# Patient Record
Sex: MALE | Race: WHITE | NOT HISPANIC OR LATINO | Employment: FULL TIME | URBAN - METROPOLITAN AREA
[De-identification: names, ages, dates, MRNs, and addresses within clinical notes are randomized per-mention and may not be internally consistent; named-entity substitution may affect disease eponyms.]

---

## 2017-05-31 PROBLEM — E03.9 HYPOTHYROIDISM: Status: ACTIVE | Noted: 2017-05-31

## 2017-05-31 PROBLEM — T78.40XA ALLERGIC: Status: ACTIVE | Noted: 2017-05-31

## 2017-05-31 PROBLEM — L98.9 SKIN LESION: Status: ACTIVE | Noted: 2017-05-31

## 2017-05-31 PROBLEM — K21.9 GERD (GASTROESOPHAGEAL REFLUX DISEASE): Status: ACTIVE | Noted: 2017-05-31

## 2017-05-31 PROBLEM — G47.9 SLEEP DISTURBANCE: Status: ACTIVE | Noted: 2017-05-31

## 2017-07-25 ENCOUNTER — LAB (OUTPATIENT)
Dept: LAB | Facility: HOSPITAL | Age: 52
End: 2017-07-25

## 2017-07-25 ENCOUNTER — OFFICE VISIT (OUTPATIENT)
Dept: ONCOLOGY | Facility: CLINIC | Age: 52
End: 2017-07-25

## 2017-07-25 VITALS
TEMPERATURE: 97.7 F | HEART RATE: 75 BPM | WEIGHT: 217 LBS | BODY MASS INDEX: 32.14 KG/M2 | HEIGHT: 69 IN | RESPIRATION RATE: 16 BRPM | DIASTOLIC BLOOD PRESSURE: 71 MMHG | SYSTOLIC BLOOD PRESSURE: 140 MMHG

## 2017-07-25 DIAGNOSIS — Z85.71 HISTORY OF HODGKIN'S DISEASE: Primary | ICD-10-CM

## 2017-07-25 DIAGNOSIS — Z85.71 HISTORY OF HODGKIN'S DISEASE: ICD-10-CM

## 2017-07-25 LAB
ALBUMIN SERPL-MCNC: 4.7 G/DL (ref 3.2–4.8)
ALBUMIN/GLOB SERPL: 1.6 G/DL (ref 1.5–2.5)
ALP SERPL-CCNC: 60 U/L (ref 25–100)
ALT SERPL W P-5'-P-CCNC: 30 U/L (ref 7–40)
ANION GAP SERPL CALCULATED.3IONS-SCNC: 6 MMOL/L (ref 3–11)
AST SERPL-CCNC: 18 U/L (ref 0–33)
BASOPHILS # BLD AUTO: 0.05 10*3/MM3 (ref 0–0.2)
BASOPHILS NFR BLD AUTO: 0.8 % (ref 0–1)
BILIRUB SERPL-MCNC: 0.3 MG/DL (ref 0.3–1.2)
BUN BLD-MCNC: 13 MG/DL (ref 9–23)
BUN/CREAT SERPL: 14.4 (ref 7–25)
CALCIUM SPEC-SCNC: 9.2 MG/DL (ref 8.7–10.4)
CHLORIDE SERPL-SCNC: 104 MMOL/L (ref 99–109)
CO2 SERPL-SCNC: 27 MMOL/L (ref 20–31)
CREAT BLD-MCNC: 0.9 MG/DL (ref 0.6–1.3)
DEPRECATED RDW RBC AUTO: 45.9 FL (ref 37–54)
EOSINOPHIL # BLD AUTO: 0.15 10*3/MM3 (ref 0–0.3)
EOSINOPHIL NFR BLD AUTO: 2.4 % (ref 0–3)
ERYTHROCYTE [DISTWIDTH] IN BLOOD BY AUTOMATED COUNT: 13.5 % (ref 11.3–14.5)
GFR SERPL CREATININE-BSD FRML MDRD: 89 ML/MIN/1.73
GLOBULIN UR ELPH-MCNC: 2.9 GM/DL
GLUCOSE BLD-MCNC: 99 MG/DL (ref 70–100)
HCT VFR BLD AUTO: 42.7 % (ref 38.9–50.9)
HGB BLD-MCNC: 14 G/DL (ref 13.1–17.5)
IMM GRANULOCYTES # BLD: 0.03 10*3/MM3 (ref 0–0.03)
IMM GRANULOCYTES NFR BLD: 0.5 % (ref 0–0.6)
LYMPHOCYTES # BLD AUTO: 1.03 10*3/MM3 (ref 0.6–4.8)
LYMPHOCYTES NFR BLD AUTO: 16.6 % (ref 24–44)
MCH RBC QN AUTO: 30.4 PG (ref 27–31)
MCHC RBC AUTO-ENTMCNC: 32.8 G/DL (ref 32–36)
MCV RBC AUTO: 92.8 FL (ref 80–99)
MONOCYTES # BLD AUTO: 0.52 10*3/MM3 (ref 0–1)
MONOCYTES NFR BLD AUTO: 8.4 % (ref 0–12)
NEUTROPHILS # BLD AUTO: 4.44 10*3/MM3 (ref 1.5–8.3)
NEUTROPHILS NFR BLD AUTO: 71.3 % (ref 41–71)
PLATELET # BLD AUTO: 339 10*3/MM3 (ref 150–450)
PMV BLD AUTO: 9 FL (ref 6–12)
POTASSIUM BLD-SCNC: 4.4 MMOL/L (ref 3.5–5.5)
PROT SERPL-MCNC: 7.6 G/DL (ref 5.7–8.2)
RBC # BLD AUTO: 4.6 10*6/MM3 (ref 4.2–5.76)
SODIUM BLD-SCNC: 137 MMOL/L (ref 132–146)
T4 FREE SERPL-MCNC: 1.29 NG/DL (ref 0.89–1.76)
TSH SERPL DL<=0.05 MIU/L-ACNC: 1.57 MIU/ML (ref 0.35–5.35)
WBC NRBC COR # BLD: 6.22 10*3/MM3 (ref 3.5–10.8)

## 2017-07-25 PROCEDURE — 84443 ASSAY THYROID STIM HORMONE: CPT | Performed by: INTERNAL MEDICINE

## 2017-07-25 PROCEDURE — 84439 ASSAY OF FREE THYROXINE: CPT | Performed by: INTERNAL MEDICINE

## 2017-07-25 PROCEDURE — 99213 OFFICE O/P EST LOW 20 MIN: CPT | Performed by: INTERNAL MEDICINE

## 2017-07-25 PROCEDURE — 80053 COMPREHEN METABOLIC PANEL: CPT | Performed by: INTERNAL MEDICINE

## 2017-07-25 PROCEDURE — 85025 COMPLETE CBC W/AUTO DIFF WBC: CPT | Performed by: INTERNAL MEDICINE

## 2017-07-25 PROCEDURE — 36415 COLL VENOUS BLD VENIPUNCTURE: CPT

## 2017-07-25 RX ORDER — LEVOTHYROXINE SODIUM 112 UG/1
TABLET ORAL
Refills: 0 | COMMUNITY
Start: 2017-07-02 | End: 2018-08-07 | Stop reason: DRUGHIGH

## 2017-07-25 RX ORDER — RANITIDINE 150 MG/1
TABLET ORAL
Refills: 0 | COMMUNITY
Start: 2017-06-05 | End: 2020-09-04

## 2017-07-25 NOTE — PROGRESS NOTES
"Chief Complaint   ***    PROBLEM LIST   Patient Active Problem List   Diagnosis   • History of Hodgkin's disease   • Hypothyroidism   • GERD (gastroesophageal reflux disease)   • Sleep disturbance   • Allergic   • Skin lesion       HISTORY OF PRESENT ILLNESS:   ***    Past Medical History, Past Surgical History, Social History, Family History have been reviewed and are without significant changes except as mentioned.      Medications:      Current Outpatient Prescriptions:   •  ALPRAZolam (XANAX) 1 MG tablet, Take 1 mg by mouth daily as needed for anxiety., Disp: , Rfl:   •  cetirizine (ZyrTEC) 10 MG tablet, Take 10 mg by mouth daily., Disp: , Rfl:   •  esomeprazole (NexIUM) 20 MG capsule, Take 20 mg by mouth daily., Disp: , Rfl:   •  levothyroxine (SYNTHROID, LEVOTHROID) 100 MCG tablet, Take 100 mcg by mouth daily., Disp: , Rfl:   •  levothyroxine (SYNTHROID, LEVOTHROID) 112 MCG tablet, take 1 tablet by mouth once daily, Disp: , Rfl: 0  •  omeprazole (PriLOSEC) 20 MG capsule, Take 20 mg by mouth daily., Disp: , Rfl:   •  raNITIdine (ZANTAC) 150 MG tablet, take 1 tablet by mouth twice a day, Disp: , Rfl: 0  •  vitamin B-12 (CYANOCOBALAMIN) 1000 MCG tablet, Take 1,000 mcg by mouth every 30 (thirty) days., Disp: , Rfl:   •  vitamin D (ERGOCALCIFEROL) 91765 UNITS capsule capsule, Take 50,000 Units by mouth 1 (one) time per week., Disp: , Rfl:   •  zolpidem (AMBIEN) 10 MG tablet, Take 10 mg by mouth at night as needed for sleep., Disp: , Rfl:     ALLERGIES:    Allergies   Allergen Reactions   • Codeine Sulfate        ROS:  Review of Systems  ***      Objective:    /71 Comment: LUE  Pulse 75  Temp 97.7 °F (36.5 °C) (Temporal Artery )   Resp 16  Ht 69\" (175.3 cm)  Wt 217 lb (98.4 kg)  BMI 32.05 kg/m2    Physical Exam ***          RECENT LABS:  Hematology WBC   Date Value Ref Range Status   07/19/2016 6.82 3.50 - 10.80 10*3/mm3 Final     Hemoglobin   Date Value Ref Range Status   07/19/2016 14.2 13.1 - 17.5 " g/dL Final     Hematocrit   Date Value Ref Range Status   07/19/2016 42.4 38.9 - 50.9 % Final     MCV   Date Value Ref Range Status   07/19/2016 89.8 80.0 - 99.0 fL Final     RDW   Date Value Ref Range Status   07/19/2016 13.3 11.3 - 14.5 % Final     MPV   Date Value Ref Range Status   07/19/2016 9.1 6.0 - 12.0 fL Final     Platelets   Date Value Ref Range Status   07/19/2016 322 150 - 450 10*3/mm3 Final     Immature Grans %   Date Value Ref Range Status   07/19/2016 0.3 0.0 - 0.6 % Final     Neutrophils, Absolute   Date Value Ref Range Status   07/19/2016 5.06 1.50 - 8.30 10*3/mm3 Final     Lymphocytes, Absolute   Date Value Ref Range Status   07/19/2016 0.91 0.60 - 4.80 10*3/mm3 Final     Monocytes, Absolute   Date Value Ref Range Status   07/19/2016 0.66 0.00 - 1.00 10*3/mm3 Final     Eosinophils, Absolute   Date Value Ref Range Status   07/19/2016 0.12 0.10 - 0.30 10*3/mm3 Final     Basophils, Absolute   Date Value Ref Range Status   07/19/2016 0.05 0.00 - 0.20 10*3/mm3 Final     Immature Grans, Absolute   Date Value Ref Range Status   07/19/2016 0.02 0.00 - 0.03 10*3/mm3 Final       Glucose   Date Value Ref Range Status   07/19/2016 90 70 - 100 mg/dL Final     Sodium   Date Value Ref Range Status   07/19/2016 136 132 - 146 mmol/L Final     Potassium   Date Value Ref Range Status   07/19/2016 4.2 3.5 - 5.5 mmol/L Final     CO2   Date Value Ref Range Status   07/19/2016 27.0 20.0 - 31.0 mmol/L Final     Chloride   Date Value Ref Range Status   07/19/2016 100 99 - 109 mmol/L Final     Anion Gap   Date Value Ref Range Status   07/19/2016 9.0 3.0 - 11.0 mmol/L Final     Creatinine   Date Value Ref Range Status   07/19/2016 0.90 0.60 - 1.30 mg/dL Final     BUN   Date Value Ref Range Status   07/19/2016 11 9 - 23 mg/dL Final     BUN/Creatinine Ratio   Date Value Ref Range Status   07/19/2016 12.2 7.0 - 25.0 Final     Calcium   Date Value Ref Range Status   07/19/2016 9.3 8.7 - 10.4 mg/dL Final     eGFR Non African  Amer   Date Value Ref Range Status   07/19/2016 90 >60 mL/min/1.73 Final     Alkaline Phosphatase   Date Value Ref Range Status   07/19/2016 51 25 - 100 U/L Final     Total Protein   Date Value Ref Range Status   07/19/2016 8.0 5.7 - 8.2 g/dL Final     ALT (SGPT)   Date Value Ref Range Status   07/19/2016 27 7 - 40 U/L Final     AST (SGOT)   Date Value Ref Range Status   07/19/2016 20 0 - 33 U/L Final     Total Bilirubin   Date Value Ref Range Status   07/19/2016 0.4 0.3 - 1.2 mg/dL Final     Albumin   Date Value Ref Range Status   07/19/2016 4.80 3.20 - 4.80 g/dL Final     Globulin   Date Value Ref Range Status   07/19/2016 3.2 gm/dL Final     A/G Ratio   Date Value Ref Range Status   07/19/2016 1.5 g/dL Final          Perf Status: ***    Assessment/Plan    Diagnoses and all orders for this visit:    History of Hodgkin's disease      ***      Jerod Tate MD , 7/25/2017    CC:

## 2017-07-25 NOTE — PROGRESS NOTES
Chief Complaint   Follow-up Hodgkin's lymphoma and chronic hypothyroidism    PROBLEM LIST   Patient Active Problem List   Diagnosis   • History of Hodgkin's disease   • Hypothyroidism   • GERD (gastroesophageal reflux disease)   • Sleep disturbance   • Allergic   • Skin lesion       HISTORY OF PRESENT ILLNESS:   This very very nice 52-year-old gentleman who I met back in 1981 due to his Hodgkin's lymphoma is here for yearly follow-up.  He's doing well in general.  He is a little discussed with his golf game but otherwise life is a blessing.  He works regularly of course.  His wife is  doing well.  His family is doing well    Past Medical History, Past Surgical History, Social History, Family History have been reviewed and are without significant changes except as mentioned.      Medications:      Current Outpatient Prescriptions:   •  ALPRAZolam (XANAX) 1 MG tablet, Take 1 mg by mouth daily as needed for anxiety., Disp: , Rfl:   •  cetirizine (ZyrTEC) 10 MG tablet, Take 10 mg by mouth daily., Disp: , Rfl:   •  esomeprazole (NexIUM) 20 MG capsule, Take 20 mg by mouth daily., Disp: , Rfl:   •  levothyroxine (SYNTHROID, LEVOTHROID) 100 MCG tablet, Take 100 mcg by mouth daily., Disp: , Rfl:   •  levothyroxine (SYNTHROID, LEVOTHROID) 112 MCG tablet, take 1 tablet by mouth once daily, Disp: , Rfl: 0  •  omeprazole (PriLOSEC) 20 MG capsule, Take 20 mg by mouth daily., Disp: , Rfl:   •  raNITIdine (ZANTAC) 150 MG tablet, take 1 tablet by mouth twice a day, Disp: , Rfl: 0  •  vitamin B-12 (CYANOCOBALAMIN) 1000 MCG tablet, Take 1,000 mcg by mouth every 30 (thirty) days., Disp: , Rfl:   •  vitamin D (ERGOCALCIFEROL) 34431 UNITS capsule capsule, Take 50,000 Units by mouth 1 (one) time per week., Disp: , Rfl:   •  zolpidem (AMBIEN) 10 MG tablet, Take 10 mg by mouth at night as needed for sleep., Disp: , Rfl:     ALLERGIES:    Allergies   Allergen Reactions   • Codeine Sulfate        ROS:  Review of Systems   Constitutional:  "Negative for activity change and appetite change.   HENT: Negative for mouth sores, sinus pressure and voice change.    Eyes: Negative for visual disturbance.   Respiratory: Negative for shortness of breath.    Cardiovascular: Negative for chest pain.   Gastrointestinal: Negative for abdominal pain and vomiting.   Genitourinary: Negative for dysuria.   Musculoskeletal: Negative for arthralgias and myalgias.   Skin: Negative for color change.   Neurological: Negative for dizziness, syncope and headaches.   Hematological: Negative for adenopathy.   Psychiatric/Behavioral: Negative for confusion, sleep disturbance and suicidal ideas. The patient is not nervous/anxious.          Objective:    /71 Comment: LUE  Pulse 75  Temp 97.7 °F (36.5 °C) (Temporal Artery )   Resp 16  Ht 69\" (175.3 cm)  Wt 217 lb (98.4 kg)  BMI 32.05 kg/m2    Physical Exam   Constitutional: He is oriented to person, place, and time. He appears well-developed and well-nourished. No distress.   HENT:   Head: Normocephalic.   Mouth/Throat: Oropharynx is clear and moist.   Eyes: Conjunctivae and EOM are normal. No scleral icterus.   Neck: Normal range of motion. Neck supple. No thyromegaly present.   Cardiovascular: Normal rate, regular rhythm and normal heart sounds.    No murmur heard.  Pulmonary/Chest: Effort normal and breath sounds normal. He has no wheezes. He has no rales.   Abdominal: Soft. Bowel sounds are normal. He exhibits no distension and no mass. There is no tenderness.   Genitourinary:   Genitourinary Comments: Testis are descended bilaterally.  No worrisome mass palpable.   Musculoskeletal: He exhibits no edema or tenderness.   Lymphadenopathy:     He has no cervical adenopathy.   Neurological: He is alert and oriented to person, place, and time. He displays normal reflexes. No cranial nerve deficit.   Skin: Skin is warm and dry. No rash noted.   He has what may be a very early tiny basal cell carcinoma on the top of his " left ear   Psychiatric: He has a normal mood and affect. Judgment normal.   Vitals reviewed.             RECENT LABS:  Hematology WBC   Date Value Ref Range Status   07/19/2016 6.82 3.50 - 10.80 10*3/mm3 Final     Hemoglobin   Date Value Ref Range Status   07/19/2016 14.2 13.1 - 17.5 g/dL Final     Hematocrit   Date Value Ref Range Status   07/19/2016 42.4 38.9 - 50.9 % Final     MCV   Date Value Ref Range Status   07/19/2016 89.8 80.0 - 99.0 fL Final     RDW   Date Value Ref Range Status   07/19/2016 13.3 11.3 - 14.5 % Final     MPV   Date Value Ref Range Status   07/19/2016 9.1 6.0 - 12.0 fL Final     Platelets   Date Value Ref Range Status   07/19/2016 322 150 - 450 10*3/mm3 Final     Immature Grans %   Date Value Ref Range Status   07/19/2016 0.3 0.0 - 0.6 % Final     Neutrophils, Absolute   Date Value Ref Range Status   07/19/2016 5.06 1.50 - 8.30 10*3/mm3 Final     Lymphocytes, Absolute   Date Value Ref Range Status   07/19/2016 0.91 0.60 - 4.80 10*3/mm3 Final     Monocytes, Absolute   Date Value Ref Range Status   07/19/2016 0.66 0.00 - 1.00 10*3/mm3 Final     Eosinophils, Absolute   Date Value Ref Range Status   07/19/2016 0.12 0.10 - 0.30 10*3/mm3 Final     Basophils, Absolute   Date Value Ref Range Status   07/19/2016 0.05 0.00 - 0.20 10*3/mm3 Final     Immature Grans, Absolute   Date Value Ref Range Status   07/19/2016 0.02 0.00 - 0.03 10*3/mm3 Final       Glucose   Date Value Ref Range Status   07/19/2016 90 70 - 100 mg/dL Final     Sodium   Date Value Ref Range Status   07/19/2016 136 132 - 146 mmol/L Final     Potassium   Date Value Ref Range Status   07/19/2016 4.2 3.5 - 5.5 mmol/L Final     CO2   Date Value Ref Range Status   07/19/2016 27.0 20.0 - 31.0 mmol/L Final     Chloride   Date Value Ref Range Status   07/19/2016 100 99 - 109 mmol/L Final     Anion Gap   Date Value Ref Range Status   07/19/2016 9.0 3.0 - 11.0 mmol/L Final     Creatinine   Date Value Ref Range Status   07/19/2016 0.90 0.60 -  1.30 mg/dL Final     BUN   Date Value Ref Range Status   07/19/2016 11 9 - 23 mg/dL Final     BUN/Creatinine Ratio   Date Value Ref Range Status   07/19/2016 12.2 7.0 - 25.0 Final     Calcium   Date Value Ref Range Status   07/19/2016 9.3 8.7 - 10.4 mg/dL Final     eGFR Non  Amer   Date Value Ref Range Status   07/19/2016 90 >60 mL/min/1.73 Final     Alkaline Phosphatase   Date Value Ref Range Status   07/19/2016 51 25 - 100 U/L Final     Total Protein   Date Value Ref Range Status   07/19/2016 8.0 5.7 - 8.2 g/dL Final     ALT (SGPT)   Date Value Ref Range Status   07/19/2016 27 7 - 40 U/L Final     AST (SGOT)   Date Value Ref Range Status   07/19/2016 20 0 - 33 U/L Final     Total Bilirubin   Date Value Ref Range Status   07/19/2016 0.4 0.3 - 1.2 mg/dL Final     Albumin   Date Value Ref Range Status   07/19/2016 4.80 3.20 - 4.80 g/dL Final     Globulin   Date Value Ref Range Status   07/19/2016 3.2 gm/dL Final     A/G Ratio   Date Value Ref Range Status   07/19/2016 1.5 g/dL Final          Perf Status: 0    Assessment/Plan    Diagnoses and all orders for this visit:    History of Hodgkin's disease  -     CBC & Differential  -     CBC & Differential; Future  -     Comprehensive Metabolic Panel  -     Comprehensive Metabolic Panel; Future  -     TSH  -     TSH; Future  -     T4, Free  -     T4, Free; Future      Patient has no evidence of any recurrence of Hodgkin's lymphoma.  As far as his hypothyroidism he appears euthyroid on his current dose of Synthroid which is 112 µg daily.    I will follow-up today's labs and call him with the results.    He will see his dermatologist in the near future.    Jerod Tate MD , 7/25/2017    CC:

## 2017-07-25 NOTE — PROGRESS NOTES
Chief Complaint   Follow-up of his lymphoma.  Chronic hypothyroidism.    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  He feels well.  His energy level is good.  He is a bit annoyed by his weight but he just can't seem to lose weight when he tries.  He is very physically active.  He's had a good summer.  Things are going reasonably well at his place of employment.  His wife is doing well.  His parents are now 80 and 82 and they're doing great     He's had lousy luck with his golf game this summer.  He's lost just about every match he has entered    Past Medical History, Past Surgical History, Social History, Family History have been reviewed and are without significant changes except as mentioned.      Medications:      Current Outpatient Prescriptions:   •  ALPRAZolam (XANAX) 1 MG tablet, Take 1 mg by mouth daily as needed for anxiety., Disp: , Rfl:   •  cetirizine (ZyrTEC) 10 MG tablet, Take 10 mg by mouth daily., Disp: , Rfl:   •  esomeprazole (NexIUM) 20 MG capsule, Take 20 mg by mouth daily., Disp: , Rfl:   •  levothyroxine (SYNTHROID, LEVOTHROID) 100 MCG tablet, Take 100 mcg by mouth daily., Disp: , Rfl:   •  levothyroxine (SYNTHROID, LEVOTHROID) 112 MCG tablet, take 1 tablet by mouth once daily, Disp: , Rfl: 0  •  omeprazole (PriLOSEC) 20 MG capsule, Take 20 mg by mouth daily., Disp: , Rfl:   •  raNITIdine (ZANTAC) 150 MG tablet, take 1 tablet by mouth twice a day, Disp: , Rfl: 0  •  vitamin B-12 (CYANOCOBALAMIN) 1000 MCG tablet, Take 1,000 mcg by mouth every 30 (thirty) days., Disp: , Rfl:   •  vitamin D (ERGOCALCIFEROL) 11918 UNITS capsule capsule, Take 50,000 Units by mouth 1 (one) time per  "week., Disp: , Rfl:   •  zolpidem (AMBIEN) 10 MG tablet, Take 10 mg by mouth at night as needed for sleep., Disp: , Rfl:     ALLERGIES:    Allergies   Allergen Reactions   • Codeine Sulfate        ROS:  Review of Systems   Constitutional: Negative for activity change and appetite change.   HENT: Negative for mouth sores, sinus pressure and voice change.    Eyes: Negative for visual disturbance.   Respiratory: Negative for shortness of breath.    Cardiovascular: Negative for chest pain.   Gastrointestinal: Negative for abdominal pain and vomiting.        He underwent recent colonoscopy which showed a tubulovillous adenoma so he'll need repeat endoscopy in about a year   Genitourinary: Negative for dysuria.   Musculoskeletal: Negative for arthralgias and myalgias.   Skin: Negative for color change.   Neurological: Negative for dizziness, syncope and headaches.   Hematological: Negative for adenopathy.   Psychiatric/Behavioral: Negative for confusion, sleep disturbance and suicidal ideas. The patient is not nervous/anxious.            Objective:    /71 Comment: LUE  Pulse 75  Temp 97.7 °F (36.5 °C) (Temporal Artery )   Resp 16  Ht 69\" (175.3 cm)  Wt 217 lb (98.4 kg)  BMI 32.05 kg/m2    Physical Exam   Constitutional: He is oriented to person, place, and time. He appears well-developed and well-nourished. No distress.   HENT:   Head: Normocephalic.   Mouth/Throat: Oropharynx is clear and moist.   Eyes: Conjunctivae and EOM are normal. No scleral icterus.   Neck: Normal range of motion. Neck supple. No thyromegaly present.   Cardiovascular: Normal rate, regular rhythm and normal heart sounds.    No murmur heard.  Pulmonary/Chest: Effort normal and breath sounds normal. He has no wheezes. He has no rales.   Abdominal: Soft. Bowel sounds are normal. He exhibits no distension and no mass. There is no tenderness.   Genitourinary:   Genitourinary Comments: Testis are descended bilaterally.  There is no worrisome " mass palpable   Musculoskeletal: He exhibits no edema or tenderness.   Lymphadenopathy:     He has no cervical adenopathy.   Neurological: He is alert and oriented to person, place, and time. He displays normal reflexes. No cranial nerve deficit.   Skin: Skin is warm and dry. No rash noted.   Psychiatric: He has a normal mood and affect. Judgment normal.   Vitals reviewed.             RECENT LABS:  Hematology WBC   Date Value Ref Range Status   07/19/2016 6.82 3.50 - 10.80 10*3/mm3 Final     Hemoglobin   Date Value Ref Range Status   07/19/2016 14.2 13.1 - 17.5 g/dL Final     Hematocrit   Date Value Ref Range Status   07/19/2016 42.4 38.9 - 50.9 % Final     MCV   Date Value Ref Range Status   07/19/2016 89.8 80.0 - 99.0 fL Final     RDW   Date Value Ref Range Status   07/19/2016 13.3 11.3 - 14.5 % Final     MPV   Date Value Ref Range Status   07/19/2016 9.1 6.0 - 12.0 fL Final     Platelets   Date Value Ref Range Status   07/19/2016 322 150 - 450 10*3/mm3 Final     Immature Grans %   Date Value Ref Range Status   07/19/2016 0.3 0.0 - 0.6 % Final     Neutrophils, Absolute   Date Value Ref Range Status   07/19/2016 5.06 1.50 - 8.30 10*3/mm3 Final     Lymphocytes, Absolute   Date Value Ref Range Status   07/19/2016 0.91 0.60 - 4.80 10*3/mm3 Final     Monocytes, Absolute   Date Value Ref Range Status   07/19/2016 0.66 0.00 - 1.00 10*3/mm3 Final     Eosinophils, Absolute   Date Value Ref Range Status   07/19/2016 0.12 0.10 - 0.30 10*3/mm3 Final     Basophils, Absolute   Date Value Ref Range Status   07/19/2016 0.05 0.00 - 0.20 10*3/mm3 Final     Immature Grans, Absolute   Date Value Ref Range Status   07/19/2016 0.02 0.00 - 0.03 10*3/mm3 Final       Glucose   Date Value Ref Range Status   07/19/2016 90 70 - 100 mg/dL Final     Sodium   Date Value Ref Range Status   07/19/2016 136 132 - 146 mmol/L Final     Potassium   Date Value Ref Range Status   07/19/2016 4.2 3.5 - 5.5 mmol/L Final     CO2   Date Value Ref Range  Status   07/19/2016 27.0 20.0 - 31.0 mmol/L Final     Chloride   Date Value Ref Range Status   07/19/2016 100 99 - 109 mmol/L Final     Anion Gap   Date Value Ref Range Status   07/19/2016 9.0 3.0 - 11.0 mmol/L Final     Creatinine   Date Value Ref Range Status   07/19/2016 0.90 0.60 - 1.30 mg/dL Final     BUN   Date Value Ref Range Status   07/19/2016 11 9 - 23 mg/dL Final     BUN/Creatinine Ratio   Date Value Ref Range Status   07/19/2016 12.2 7.0 - 25.0 Final     Calcium   Date Value Ref Range Status   07/19/2016 9.3 8.7 - 10.4 mg/dL Final     eGFR Non  Amer   Date Value Ref Range Status   07/19/2016 90 >60 mL/min/1.73 Final     Alkaline Phosphatase   Date Value Ref Range Status   07/19/2016 51 25 - 100 U/L Final     Total Protein   Date Value Ref Range Status   07/19/2016 8.0 5.7 - 8.2 g/dL Final     ALT (SGPT)   Date Value Ref Range Status   07/19/2016 27 7 - 40 U/L Final     AST (SGOT)   Date Value Ref Range Status   07/19/2016 20 0 - 33 U/L Final     Total Bilirubin   Date Value Ref Range Status   07/19/2016 0.4 0.3 - 1.2 mg/dL Final     Albumin   Date Value Ref Range Status   07/19/2016 4.80 3.20 - 4.80 g/dL Final     Globulin   Date Value Ref Range Status   07/19/2016 3.2 gm/dL Final     A/G Ratio   Date Value Ref Range Status   07/19/2016 1.5 g/dL Final          Perf Status: 0    Assessment/Plan    Diagnoses and all orders for this visit:    History of Hodgkin's disease    Patient has no evidence of recurrent Hodgkin's.  He appears to be euthyroid by physical examination and by history.    I'll follow-up today's laboratory data.  I'll see him back in a year        Jerod Tate MD , 7/25/2017    CC:

## 2018-07-30 DIAGNOSIS — E03.9 HYPOTHYROIDISM, UNSPECIFIED TYPE: ICD-10-CM

## 2018-07-30 DIAGNOSIS — Z85.71 HISTORY OF HODGKIN'S DISEASE: Primary | ICD-10-CM

## 2018-08-02 ENCOUNTER — OFFICE VISIT (OUTPATIENT)
Dept: ONCOLOGY | Facility: CLINIC | Age: 53
End: 2018-08-02

## 2018-08-02 VITALS
SYSTOLIC BLOOD PRESSURE: 140 MMHG | TEMPERATURE: 98.1 F | BODY MASS INDEX: 28.29 KG/M2 | DIASTOLIC BLOOD PRESSURE: 81 MMHG | WEIGHT: 191 LBS | HEIGHT: 69 IN | RESPIRATION RATE: 16 BRPM | HEART RATE: 71 BPM

## 2018-08-02 DIAGNOSIS — E03.9 HYPOTHYROIDISM, UNSPECIFIED TYPE: ICD-10-CM

## 2018-08-02 DIAGNOSIS — N50.811 TESTICULAR PAIN, RIGHT: Primary | ICD-10-CM

## 2018-08-02 DIAGNOSIS — Z85.71 HISTORY OF HODGKIN'S DISEASE: ICD-10-CM

## 2018-08-02 DIAGNOSIS — Z85.71 HISTORY OF HODGKIN'S DISEASE: Primary | ICD-10-CM

## 2018-08-02 PROCEDURE — 99213 OFFICE O/P EST LOW 20 MIN: CPT | Performed by: INTERNAL MEDICINE

## 2018-08-02 NOTE — PROGRESS NOTES
Chief Complaint   Follow-up remote history of Hodgkin's lymphoma.  Follow-up chronic hypothyroidism.    PROBLEM LIST   Patient Active Problem List   Diagnosis   • History of Hodgkin's disease   • Hypothyroidism   • GERD (gastroesophageal reflux disease)   • Sleep disturbance   • Allergic   • Skin lesion       HISTORY OF PRESENT ILLNESS:   Yearly follow-up for this very nice 53-year-old who I met close to 30 years ago.  He was treated with chemotherapy and subcutaneous radiotherapy for his bulky mediastinal advanced stage lymphoma-classic Hodgkin's.  He's never had a recurrence.  I have continued to see most recently at yearly intervals.    His health is been good.  He's voluntarily lost about 30 or 35 pounds.  He remains very busy as far as his work week.  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    He is continued to have some discomfort which she's had for probably more than a year involving the right testis without any pain at rest.  It's more matter of tenderness.  He exam is himself frequently and he has not noted any Swelling or any count of mass at all.  His health is otherwise been good.    Past Medical History, Past Surgical History, Social History, Family History have been reviewed and are without significant changes except as mentioned.      Medications:      Current Outpatient Prescriptions:   •  ALPRAZolam (XANAX) 1 MG tablet, Take 1 mg by mouth daily as needed for anxiety., Disp: , Rfl:   •  cetirizine (ZyrTEC) 10 MG tablet, Take 10 mg by mouth daily., Disp: , Rfl:   •  esomeprazole (NexIUM) 20 MG capsule, Take 20 mg by mouth daily., Disp: , Rfl:   •  levothyroxine (SYNTHROID, LEVOTHROID) 100 MCG tablet, Take 100 mcg by mouth daily., Disp: , Rfl:   •  levothyroxine (SYNTHROID, LEVOTHROID) 112 MCG tablet, take 1 tablet by mouth once daily, Disp: , Rfl: 0  •  omeprazole (PriLOSEC) 20 MG capsule, Take 20 mg by mouth daily., Disp: , Rfl:   •  raNITIdine (ZANTAC) 150 MG tablet, take 1 tablet by mouth twice a day,  "Disp: , Rfl: 0  •  vitamin B-12 (CYANOCOBALAMIN) 1000 MCG tablet, Take 1,000 mcg by mouth every 30 (thirty) days., Disp: , Rfl:   •  vitamin D (ERGOCALCIFEROL) 39302 UNITS capsule capsule, Take 50,000 Units by mouth 1 (one) time per week., Disp: , Rfl:   •  zolpidem (AMBIEN) 10 MG tablet, Take 10 mg by mouth at night as needed for sleep., Disp: , Rfl:     ALLERGIES:    Allergies   Allergen Reactions   • Codeine Sulfate        ROS:  Review of Systems   Constitutional: Negative for activity change and appetite change.        Overall vigor has been good.  His voluntary weight loss has been good for him   HENT: Negative for mouth sores, sinus pressure and voice change.    Eyes: Negative for visual disturbance.   Respiratory: Negative for shortness of breath.    Cardiovascular: Negative for chest pain.   Gastrointestinal: Negative for abdominal pain and vomiting.   Endocrine:        Hypothyroidism-chronic-supplemented   Genitourinary: Negative for dysuria.        Right testis tenderness mentioned   Musculoskeletal: Negative for arthralgias and myalgias.   Skin: Negative for color change.   Neurological: Negative for dizziness, syncope and headaches.   Hematological: Negative for adenopathy.   Psychiatric/Behavioral: Negative for confusion, sleep disturbance and suicidal ideas. The patient is not nervous/anxious.            Objective:    /81 Comment: CURRYE  Pulse 71   Temp 98.1 °F (36.7 °C) (Temporal Artery )   Resp 16   Ht 175.3 cm (69\")   Wt 86.6 kg (191 lb)   BMI 28.21 kg/m²     Physical Exam   Constitutional: He is oriented to person, place, and time. He appears well-developed and well-nourished. No distress.   Angel appears youthful healthy and vigorous.  He is in no distress.   HENT:   Head: Normocephalic.   Mouth/Throat: Oropharynx is clear and moist.   He has no head and neck lesions noted.  He has no lymphadenopathy in the head and neck or anywhere else   Eyes: Conjunctivae and EOM are normal. No scleral " icterus.   Neck: Normal range of motion. Neck supple. No thyromegaly present.   Cardiovascular: Normal rate, regular rhythm and normal heart sounds.    No murmur heard.  Pulmonary/Chest: Effort normal and breath sounds normal. He has no wheezes. He has no rales.   Abdominal: Soft. Bowel sounds are normal. He exhibits no distension and no mass. There is no tenderness.   Genitourinary:   Genitourinary Comments: Testis are descended bilaterally.  They appear to be normal in size and consistency and there is no worrisome mass palpable.  He has minimal tenderness when I examine his right testis   Musculoskeletal: He exhibits no edema or tenderness.   Lymphadenopathy:     He has no cervical adenopathy.   Neurological: He is alert and oriented to person, place, and time. He displays normal reflexes. No cranial nerve deficit.   Skin: Skin is warm and dry. No rash noted.   Psychiatric: He has a normal mood and affect. Judgment normal.   Vitals reviewed.             RECENT LABS:  Hematology WBC   Date Value Ref Range Status   08/02/2018 7.00 3.50 - 10.80 10*3/mm3 Final     Hemoglobin   Date Value Ref Range Status   08/02/2018 14.1 13.1 - 17.5 g/dL Final     Hematocrit   Date Value Ref Range Status   08/02/2018 43.7 38.9 - 50.9 % Final     MCV   Date Value Ref Range Status   08/02/2018 90.6 80.0 - 99.0 fL Final     RDW   Date Value Ref Range Status   08/02/2018 14.4 11.3 - 14.5 % Final     MPV   Date Value Ref Range Status   08/02/2018 6.9 6.0 - 12.0 fL Final     Platelets   Date Value Ref Range Status   08/02/2018 384 150 - 450 10*3/mm3 Final     Immature Grans %   Date Value Ref Range Status   07/25/2017 0.5 0.0 - 0.6 % Final     Neutrophils, Absolute   Date Value Ref Range Status   08/02/2018 5.30 1.50 - 8.30 10*3/mm3 Final     Lymphocytes, Absolute   Date Value Ref Range Status   08/02/2018 1.30 0.60 - 4.80 10*3/mm3 Final     Monocytes, Absolute   Date Value Ref Range Status   08/02/2018 0.40 0.00 - 1.00 10*3/mm3 Final      Eosinophils, Absolute   Date Value Ref Range Status   07/25/2017 0.15 0.00 - 0.30 10*3/mm3 Final     Basophils, Absolute   Date Value Ref Range Status   07/25/2017 0.05 0.00 - 0.20 10*3/mm3 Final     Immature Grans, Absolute   Date Value Ref Range Status   07/25/2017 0.03 0.00 - 0.03 10*3/mm3 Final       Glucose   Date Value Ref Range Status   07/25/2017 99 70 - 100 mg/dL Final     Sodium   Date Value Ref Range Status   07/25/2017 137 132 - 146 mmol/L Final     Potassium   Date Value Ref Range Status   07/25/2017 4.4 3.5 - 5.5 mmol/L Final     CO2   Date Value Ref Range Status   07/25/2017 27.0 20.0 - 31.0 mmol/L Final     Chloride   Date Value Ref Range Status   07/25/2017 104 99 - 109 mmol/L Final     Anion Gap   Date Value Ref Range Status   07/25/2017 6.0 3.0 - 11.0 mmol/L Final     Creatinine   Date Value Ref Range Status   07/25/2017 0.90 0.60 - 1.30 mg/dL Final     BUN   Date Value Ref Range Status   07/25/2017 13 9 - 23 mg/dL Final     BUN/Creatinine Ratio   Date Value Ref Range Status   07/25/2017 14.4 7.0 - 25.0 Final     Calcium   Date Value Ref Range Status   07/25/2017 9.2 8.7 - 10.4 mg/dL Final     eGFR Non  Amer   Date Value Ref Range Status   07/25/2017 89 >60 mL/min/1.73 Final     Alkaline Phosphatase   Date Value Ref Range Status   07/25/2017 60 25 - 100 U/L Final     Total Protein   Date Value Ref Range Status   07/25/2017 7.6 5.7 - 8.2 g/dL Final     ALT (SGPT)   Date Value Ref Range Status   07/25/2017 30 7 - 40 U/L Final     AST (SGOT)   Date Value Ref Range Status   07/25/2017 18 0 - 33 U/L Final     Total Bilirubin   Date Value Ref Range Status   07/25/2017 0.3 0.3 - 1.2 mg/dL Final     Albumin   Date Value Ref Range Status   07/25/2017 4.70 3.20 - 4.80 g/dL Final     Globulin   Date Value Ref Range Status   07/25/2017 2.9 gm/dL Final          Perf Status: 0    Assessment/Plan    Diagnoses and all orders for this visit:    History of Hodgkin's disease      The patient is doing  splendidly.  He has no evidence of recurrent lymphoma.  He has no active lesions that suggest any type of skin cancers which she has had before in the past.    I suspect his right testicular tenderness is related to his long driving times and do not think he has any pathology in that regard.  However I will asked Dr. Louie Alejandra see him just to reassure the patient as well as myself.  He'll otherwise return in a year.  I'll make sure he is up-to-date regarding his prescriptions.      Jerod Tate MD , 8/2/2018    CC:

## 2018-08-07 ENCOUNTER — DOCUMENTATION (OUTPATIENT)
Dept: ONCOLOGY | Facility: CLINIC | Age: 53
End: 2018-08-07

## 2018-08-07 DIAGNOSIS — E03.9 HYPOTHYROIDISM, UNSPECIFIED TYPE: Primary | ICD-10-CM

## 2018-08-07 RX ORDER — LEVOTHYROXINE SODIUM 0.12 MG/1
125 TABLET ORAL DAILY
Qty: 30 TABLET | Refills: 11 | Status: SHIPPED | OUTPATIENT
Start: 2018-08-07 | End: 2019-08-12 | Stop reason: SDUPTHER

## 2018-08-07 NOTE — PROGRESS NOTES
Patient's labs looked excellent although TSH is up a bit.  Synthroid will be changed from 112 µg daily 225 µg daily and a TSH and T4 will be checked in 2 months.    His hypothyroidism has been slow and chronic and is no doubt due to his prior radiotherapy years ago for his Hodgkin's

## 2019-07-30 DIAGNOSIS — E03.9 HYPOTHYROIDISM, UNSPECIFIED TYPE: Primary | ICD-10-CM

## 2019-07-30 DIAGNOSIS — Z85.71 HISTORY OF HODGKIN'S DISEASE: ICD-10-CM

## 2019-08-02 ENCOUNTER — LAB (OUTPATIENT)
Dept: LAB | Facility: HOSPITAL | Age: 54
End: 2019-08-02

## 2019-08-02 ENCOUNTER — OFFICE VISIT (OUTPATIENT)
Dept: ONCOLOGY | Facility: CLINIC | Age: 54
End: 2019-08-02

## 2019-08-02 VITALS
BODY MASS INDEX: 30.36 KG/M2 | HEART RATE: 75 BPM | SYSTOLIC BLOOD PRESSURE: 140 MMHG | OXYGEN SATURATION: 94 % | WEIGHT: 205 LBS | HEIGHT: 69 IN | TEMPERATURE: 98.4 F | RESPIRATION RATE: 18 BRPM | DIASTOLIC BLOOD PRESSURE: 83 MMHG

## 2019-08-02 DIAGNOSIS — Z85.71 HISTORY OF HODGKIN'S DISEASE: Primary | ICD-10-CM

## 2019-08-02 DIAGNOSIS — Z85.71 HISTORY OF HODGKIN'S DISEASE: ICD-10-CM

## 2019-08-02 DIAGNOSIS — E03.9 HYPOTHYROIDISM, UNSPECIFIED TYPE: ICD-10-CM

## 2019-08-02 LAB
ALBUMIN SERPL-MCNC: 4.7 G/DL (ref 3.5–5.2)
ALBUMIN/GLOB SERPL: 1.7 G/DL
ALP SERPL-CCNC: 59 U/L (ref 39–117)
ALT SERPL W P-5'-P-CCNC: 19 U/L (ref 1–41)
ANION GAP SERPL CALCULATED.3IONS-SCNC: 13 MMOL/L (ref 5–15)
AST SERPL-CCNC: 15 U/L (ref 1–40)
BILIRUB SERPL-MCNC: 0.4 MG/DL (ref 0.2–1.2)
BUN BLD-MCNC: 15 MG/DL (ref 6–20)
BUN/CREAT SERPL: 15.2 (ref 7–25)
CALCIUM SPEC-SCNC: 9.6 MG/DL (ref 8.6–10.5)
CHLORIDE SERPL-SCNC: 98 MMOL/L (ref 98–107)
CO2 SERPL-SCNC: 27 MMOL/L (ref 22–29)
CREAT BLD-MCNC: 0.99 MG/DL (ref 0.76–1.27)
ERYTHROCYTE [DISTWIDTH] IN BLOOD BY AUTOMATED COUNT: 13.5 % (ref 12.3–15.4)
GFR SERPL CREATININE-BSD FRML MDRD: 79 ML/MIN/1.73
GLOBULIN UR ELPH-MCNC: 2.7 GM/DL
GLUCOSE BLD-MCNC: 83 MG/DL (ref 65–99)
HCT VFR BLD AUTO: 40.6 % (ref 37.5–51)
HGB BLD-MCNC: 13.9 G/DL (ref 13–17.7)
LYMPHOCYTES # BLD AUTO: 1.4 10*3/MM3 (ref 0.7–3.1)
LYMPHOCYTES NFR BLD AUTO: 20.7 % (ref 19.6–45.3)
MCH RBC QN AUTO: 31.3 PG (ref 26.6–33)
MCHC RBC AUTO-ENTMCNC: 34.3 G/DL (ref 31.5–35.7)
MCV RBC AUTO: 91.3 FL (ref 79–97)
MONOCYTES # BLD AUTO: 0.5 10*3/MM3 (ref 0.1–0.9)
MONOCYTES NFR BLD AUTO: 7.2 % (ref 5–12)
NEUTROPHILS # BLD AUTO: 5 10*3/MM3 (ref 1.7–7)
NEUTROPHILS NFR BLD AUTO: 72.1 % (ref 42.7–76)
PLATELET # BLD AUTO: 388 10*3/MM3 (ref 140–450)
PMV BLD AUTO: 6.5 FL (ref 6–12)
POTASSIUM BLD-SCNC: 4.5 MMOL/L (ref 3.5–5.2)
PROT SERPL-MCNC: 7.4 G/DL (ref 6–8.5)
RBC # BLD AUTO: 4.45 10*6/MM3 (ref 4.14–5.8)
SODIUM BLD-SCNC: 138 MMOL/L (ref 136–145)
T4 FREE SERPL-MCNC: 1.5 NG/DL (ref 0.93–1.7)
TSH SERPL DL<=0.05 MIU/L-ACNC: 2.71 MIU/ML (ref 0.27–4.2)
WBC NRBC COR # BLD: 7 10*3/MM3 (ref 3.4–10.8)

## 2019-08-02 PROCEDURE — 84439 ASSAY OF FREE THYROXINE: CPT

## 2019-08-02 PROCEDURE — 85025 COMPLETE CBC W/AUTO DIFF WBC: CPT

## 2019-08-02 PROCEDURE — 80053 COMPREHEN METABOLIC PANEL: CPT

## 2019-08-02 PROCEDURE — 84443 ASSAY THYROID STIM HORMONE: CPT

## 2019-08-02 PROCEDURE — 99213 OFFICE O/P EST LOW 20 MIN: CPT | Performed by: INTERNAL MEDICINE

## 2019-08-02 PROCEDURE — 36415 COLL VENOUS BLD VENIPUNCTURE: CPT

## 2019-08-02 NOTE — PROGRESS NOTES
PROBLEM LIST:    1.  History of Hodgkin lymphoma diagnosed in 1989-treated with systemic  Chemotherapy and radiation  2.  Chronic hypothyroidism.   3.  Gastroesophageal reflux disease.   4.  Chronic sleep disturbance.   5.  Extrinsic allergies  6.  Recurrent epididymitis  7.  History of craniotomy for a colloid cyst        REASON FOR VISIT: Follow up for hodgkins     HISTORY OF PRESENT ILLNESS:   54 y.o.  male presents today for follow-up.  He has a history of Hodgkin's lymphoma treated in 1989 by Dr. Tate at that time.  He underwent chemotherapy and radiation.  Clinically he is doing well.  He sees his primary care doctor Dr. Leos  between our visits.  He denies any major issues ongoing at this time.  He does have periods of recurrent epididymitis.  He is followed by Dr. Mantilla at times for that.  Denies any major issues with cough or fever or weight loss.    Past medical history, social history and family history was reviewed and unchanged from prior visit.    Review of Systems:    Review of Systems - Oncology   A comprehensive 14 point review of systems was performed and was negative except as mentioned.      Medications:        Current Outpatient Medications:   •  ALPRAZolam (XANAX) 1 MG tablet, Take 1 mg by mouth daily as needed for anxiety., Disp: , Rfl:   •  cetirizine (ZyrTEC) 10 MG tablet, Take 10 mg by mouth daily., Disp: , Rfl:   •  esomeprazole (NexIUM) 20 MG capsule, Take 20 mg by mouth daily., Disp: , Rfl:   •  levothyroxine (SYNTHROID) 125 MCG tablet, Take 1 tablet by mouth Daily., Disp: 30 tablet, Rfl: 11  •  raNITIdine (ZANTAC) 150 MG tablet, take 1 tablet by mouth twice a day, Disp: , Rfl: 0  •  vitamin D (ERGOCALCIFEROL) 85941 UNITS capsule capsule, Take 50,000 Units by mouth 1 (one) time per week., Disp: , Rfl:   •  zolpidem (AMBIEN) 10 MG tablet, Take 10 mg by mouth at night as needed for sleep., Disp: , Rfl:   •  omeprazole (PriLOSEC) 20 MG capsule, Take 20 mg by mouth daily., Disp: ,  "Rfl:   •  vitamin B-12 (CYANOCOBALAMIN) 1000 MCG tablet, Take 1,000 mcg by mouth every 30 (thirty) days., Disp: , Rfl:       ALLERGIES:    Allergies   Allergen Reactions   • Codeine Sulfate          Physical Exam    VITAL SIGNS:  /83 Comment: LUE  Pulse 75   Temp 98.4 °F (36.9 °C) (Temporal)   Resp 18   Ht 175.3 cm (69\")   Wt 93 kg (205 lb)   SpO2 94% Comment: RA  BMI 30.27 kg/m²     Wt Readings from Last 3 Encounters:   08/02/19 93 kg (205 lb)   08/02/18 86.6 kg (191 lb)   07/25/17 98.4 kg (217 lb)       Body mass index is 30.27 kg/m². Body surface area is 2.09 meters squared.         Performance Status: 0    General: well appearing, in no acute distress  HEENT: sclera anicteric, oropharynx clear, neck is supple  Lymphatics: no cervical, supraclavicular, or axillary adenopathy  Cardiovascular: regular rate and rhythm, no murmurs, rubs or gallops  Lungs: clear to auscultation bilaterally  Abdomen: soft, nontender, nondistended.  No palpable organomegaly  Extremities: no lower extremity edema  Skin: no rashes, lesions, bruising, or petechiae  Msk:  Shows no weakness of the large muscle groups  Psych: Mood is stable        RECENT LABS:    Lab Results   Component Value Date    HGB 13.9 08/02/2019    HCT 40.6 08/02/2019    MCV 91.3 08/02/2019     08/02/2019    WBC 7.00 08/02/2019    NEUTROABS 5.00 08/02/2019    LYMPHSABS 1.40 08/02/2019    MONOSABS 0.50 08/02/2019    EOSABS 0.15 07/25/2017    BASOSABS 0.05 07/25/2017       Lab Results   Component Value Date    GLUCOSE 100 08/02/2018    BUN 10 08/02/2018    CREATININE 0.94 08/02/2018     08/02/2018    K 4.0 08/02/2018     08/02/2018    CO2 25.0 08/02/2018    CALCIUM 9.2 08/02/2018    PROTEINTOT 7.9 08/02/2018    ALBUMIN 4.96 (H) 08/02/2018    BILITOT 0.5 08/02/2018    ALKPHOS 58 08/02/2018    AST 20 08/02/2018    ALT 22 08/02/2018       Assessment/Plan    1.  History of Hodgkin's lymphoma with acute history of chemotherapy and radiation.  " Late effects of radiation is probably the more concerning issue.  There is a risk of a secondary malignancies such as lung cancer.  Thyroid issues can also occur but after 30 years unlikely.          I spent a total of 15 minutes in direct patient care, greater than 10 minutes (greater than 50%) were spent in coordination of care, and counseling the patient regarding Hodgkin's lymphoma. Answered any questions patient had with the plan.      Rashi Rossi MD  [facility] Hematology and Oncology    Return in (Approximately): 1 year    Orders Placed This Encounter   Procedures   • T4, Free   • TSH   • CBC & Differential       8/2/2019         Please note that portions of this note may have been completed with a voice recognition program. Efforts were made to edit the dictations, but occasionally words are mistranscribed.

## 2019-08-12 RX ORDER — LEVOTHYROXINE SODIUM 0.12 MG/1
125 TABLET ORAL DAILY
Qty: 30 TABLET | Refills: 11 | Status: SHIPPED | OUTPATIENT
Start: 2019-08-12 | End: 2020-09-04

## 2020-07-31 ENCOUNTER — TELEPHONE (OUTPATIENT)
Dept: ONCOLOGY | Facility: CLINIC | Age: 55
End: 2020-07-31

## 2020-07-31 NOTE — TELEPHONE ENCOUNTER
PT WOULD LIKE TO RESCHEDULE HIS 8/13 APPT WITH DR OCHOA.    HE IS AVAILABLE ON 8/21 OR ANYTIME IN SEPT.    BEST CALL BACK # [phone]

## 2020-09-03 ENCOUNTER — OFFICE VISIT (OUTPATIENT)
Dept: ONCOLOGY | Facility: CLINIC | Age: 55
End: 2020-09-03

## 2020-09-03 ENCOUNTER — HOSPITAL ENCOUNTER (OUTPATIENT)
Dept: GENERAL RADIOLOGY | Facility: HOSPITAL | Age: 55
Discharge: HOME | End: 2020-09-03
Admitting: INTERNAL MEDICINE

## 2020-09-03 ENCOUNTER — LAB (OUTPATIENT)
Dept: LAB | Facility: HOSPITAL | Age: 55
End: 2020-09-03

## 2020-09-03 VITALS
DIASTOLIC BLOOD PRESSURE: 93 MMHG | SYSTOLIC BLOOD PRESSURE: 174 MMHG | HEART RATE: 77 BPM | RESPIRATION RATE: 18 BRPM | OXYGEN SATURATION: 94 % | HEIGHT: 69 IN | WEIGHT: 211 LBS | BODY MASS INDEX: 31.25 KG/M2 | TEMPERATURE: 97.8 F

## 2020-09-03 DIAGNOSIS — Z85.71 HISTORY OF HODGKIN'S DISEASE: ICD-10-CM

## 2020-09-03 DIAGNOSIS — Z85.71 HISTORY OF HODGKIN'S DISEASE: Primary | ICD-10-CM

## 2020-09-03 LAB
ALBUMIN SERPL-MCNC: 4.9 G/DL (ref 3.5–5.2)
ALBUMIN/GLOB SERPL: 1.8 G/DL
ALP SERPL-CCNC: 51 U/L (ref 39–117)
ALT SERPL W P-5'-P-CCNC: 28 U/L (ref 1–41)
ANION GAP SERPL CALCULATED.3IONS-SCNC: 11 MMOL/L (ref 5–15)
AST SERPL-CCNC: 23 U/L (ref 1–40)
BASOPHILS # BLD AUTO: 0.12 10*3/MM3 (ref 0–0.2)
BASOPHILS NFR BLD AUTO: 1.9 % (ref 0–1.5)
BILIRUB SERPL-MCNC: 0.3 MG/DL (ref 0–1.2)
BUN SERPL-MCNC: 12 MG/DL (ref 6–20)
BUN/CREAT SERPL: 11.4 (ref 7–25)
CALCIUM SPEC-SCNC: 9.4 MG/DL (ref 8.6–10.5)
CHLORIDE SERPL-SCNC: 100 MMOL/L (ref 98–107)
CO2 SERPL-SCNC: 26 MMOL/L (ref 22–29)
CREAT SERPL-MCNC: 1.05 MG/DL (ref 0.76–1.27)
DEPRECATED RDW RBC AUTO: 47.7 FL (ref 37–54)
EOSINOPHIL # BLD AUTO: 0.19 10*3/MM3 (ref 0–0.4)
EOSINOPHIL NFR BLD AUTO: 3.1 % (ref 0.3–6.2)
ERYTHROCYTE [DISTWIDTH] IN BLOOD BY AUTOMATED COUNT: 14 % (ref 12.3–15.4)
GFR SERPL CREATININE-BSD FRML MDRD: 74 ML/MIN/1.73
GLOBULIN UR ELPH-MCNC: 2.8 GM/DL
GLUCOSE SERPL-MCNC: 105 MG/DL (ref 65–99)
HCT VFR BLD AUTO: 41.6 % (ref 37.5–51)
HGB BLD-MCNC: 13.5 G/DL (ref 13–17.7)
IMM GRANULOCYTES # BLD AUTO: 0.03 10*3/MM3 (ref 0–0.05)
IMM GRANULOCYTES NFR BLD AUTO: 0.5 % (ref 0–0.5)
LYMPHOCYTES # BLD AUTO: 1.2 10*3/MM3 (ref 0.7–3.1)
LYMPHOCYTES NFR BLD AUTO: 19.4 % (ref 19.6–45.3)
MCH RBC QN AUTO: 30.2 PG (ref 26.6–33)
MCHC RBC AUTO-ENTMCNC: 32.5 G/DL (ref 31.5–35.7)
MCV RBC AUTO: 93.1 FL (ref 79–97)
MONOCYTES # BLD AUTO: 0.59 10*3/MM3 (ref 0.1–0.9)
MONOCYTES NFR BLD AUTO: 9.5 % (ref 5–12)
NEUTROPHILS NFR BLD AUTO: 4.07 10*3/MM3 (ref 1.7–7)
NEUTROPHILS NFR BLD AUTO: 65.6 % (ref 42.7–76)
NRBC BLD AUTO-RTO: 0 /100 WBC (ref 0–0.2)
PLATELET # BLD AUTO: 319 10*3/MM3 (ref 140–450)
PMV BLD AUTO: 9.1 FL (ref 6–12)
POTASSIUM SERPL-SCNC: 4.2 MMOL/L (ref 3.5–5.2)
PROT SERPL-MCNC: 7.7 G/DL (ref 6–8.5)
RBC # BLD AUTO: 4.47 10*6/MM3 (ref 4.14–5.8)
SODIUM SERPL-SCNC: 137 MMOL/L (ref 136–145)
TSH SERPL DL<=0.05 MIU/L-ACNC: 3.47 UIU/ML (ref 0.27–4.2)
WBC # BLD AUTO: 6.2 10*3/MM3 (ref 3.4–10.8)

## 2020-09-03 PROCEDURE — 71046 X-RAY EXAM CHEST 2 VIEWS: CPT

## 2020-09-03 PROCEDURE — 99213 OFFICE O/P EST LOW 20 MIN: CPT | Performed by: INTERNAL MEDICINE

## 2020-09-03 PROCEDURE — 36415 COLL VENOUS BLD VENIPUNCTURE: CPT

## 2020-09-03 PROCEDURE — 85025 COMPLETE CBC W/AUTO DIFF WBC: CPT

## 2020-09-03 PROCEDURE — 84443 ASSAY THYROID STIM HORMONE: CPT

## 2020-09-03 PROCEDURE — 80053 COMPREHEN METABOLIC PANEL: CPT

## 2020-09-03 RX ORDER — VALSARTAN 160 MG/1
TABLET ORAL
COMMUNITY
Start: 2020-06-24

## 2020-09-04 ENCOUNTER — TELEPHONE (OUTPATIENT)
Dept: ONCOLOGY | Facility: CLINIC | Age: 55
End: 2020-09-04

## 2020-09-04 RX ORDER — LEVOTHYROXINE SODIUM 0.12 MG/1
TABLET ORAL
Qty: 30 TABLET | Refills: 11 | Status: SHIPPED | OUTPATIENT
Start: 2020-09-04

## 2020-09-04 NOTE — TELEPHONE ENCOUNTER
Called patient and informing him of lab results are normal. Patient requesting xchest xray result. Informing patient that chest xray normal range.

## 2020-09-04 NOTE — PROGRESS NOTES
PROBLEM LIST:    1.  History of Hodgkin lymphoma diagnosed in 1989-treated with systemic  Chemotherapy and radiation  2.  Chronic hypothyroidism.   3.  Gastroesophageal reflux disease.   4.  Chronic sleep disturbance.   5.  Extrinsic allergies  6.  Recurrent epididymitis  7.  History of craniotomy for a colloid cyst        REASON FOR VISIT: Follow up for hodgkins     HISTORY OF PRESENT ILLNESS:   55 y.o.  male presents today for follow-up.  He has a history of Hodgkin's lymphoma treated in 1989 by Dr. Tate at that time.  He underwent chemotherapy and radiation.  Clinically he is doing well.  He sees his primary care doctor Dr. Leos  between our visits.  He denies any major issues ongoing at this time.  He does have periods of recurrent epididymitis.  He is followed by Dr. Mantilla at times for that.  Denies any major issues with cough or fever or weight loss. No cough, no shortness of breath.    Past medical history, social history and family history was reviewed and unchanged from prior visit.    Review of Systems:    Review of Systems   Constitutional: Negative.    HENT:  Negative.    Eyes: Negative.    Respiratory: Negative.    Cardiovascular: Negative.    Gastrointestinal: Negative.    Endocrine: Negative.    Genitourinary: Negative.     Musculoskeletal: Negative.    Skin: Negative.    Neurological: Negative.    Hematological: Negative.    Psychiatric/Behavioral: Negative.       A comprehensive 14 point review of systems was performed and was negative except as mentioned.      Medications:        Current Outpatient Medications:   •  ALPRAZolam (XANAX) 1 MG tablet, Take 1 mg by mouth daily as needed for anxiety., Disp: , Rfl:   •  cetirizine (ZyrTEC) 10 MG tablet, Take 10 mg by mouth daily., Disp: , Rfl:   •  esomeprazole (NexIUM) 20 MG capsule, Take 20 mg by mouth daily., Disp: , Rfl:   •  levothyroxine (SYNTHROID) 125 MCG tablet, Take 1 tablet by mouth Daily., Disp: 30 tablet, Rfl: 11  •  valsartan (DIOVAN)  "160 MG tablet, TAKE 2 TABLETS PO QD, Disp: , Rfl:   •  zolpidem (AMBIEN) 10 MG tablet, Take 10 mg by mouth at night as needed for sleep., Disp: , Rfl:       ALLERGIES:    Allergies   Allergen Reactions   • Codeine Sulfate          Physical Exam    VITAL SIGNS:  /93 Comment: LUE  Pulse 77   Temp 97.8 °F (36.6 °C) (Temporal)   Resp 18   Ht 175.3 cm (69\")   Wt 95.7 kg (211 lb)   SpO2 94% Comment: RA  BMI 31.16 kg/m²     Wt Readings from Last 3 Encounters:   09/03/20 95.7 kg (211 lb)   08/02/19 93 kg (205 lb)   08/02/18 86.6 kg (191 lb)       Body mass index is 31.16 kg/m². Body surface area is 2.11 meters squared.         Performance Status: 0    General: well appearing, in no acute distress  HEENT: sclera anicteric, oropharynx clear, neck is supple  Lymphatics: no cervical, supraclavicular, or axillary adenopathy  Cardiovascular: regular rate and rhythm, no murmurs, rubs or gallops  Lungs: clear to auscultation bilaterally  Abdomen: soft, nontender, nondistended.  No palpable organomegaly  Extremities: no lower extremity edema  Skin: no rashes, lesions, bruising, or petechiae  Msk:  Shows no weakness of the large muscle groups  Psych: Mood is stable        RECENT LABS:    Lab Results   Component Value Date    HGB 13.5 09/03/2020    HCT 41.6 09/03/2020    MCV 93.1 09/03/2020     09/03/2020    WBC 6.20 09/03/2020    NEUTROABS 4.07 09/03/2020    LYMPHSABS 1.20 09/03/2020    MONOSABS 0.59 09/03/2020    EOSABS 0.19 09/03/2020    BASOSABS 0.12 09/03/2020       Lab Results   Component Value Date    GLUCOSE 105 (H) 09/03/2020    BUN 12 09/03/2020    CREATININE 1.05 09/03/2020     09/03/2020    K 4.2 09/03/2020     09/03/2020    CO2 26.0 09/03/2020    CALCIUM 9.4 09/03/2020    PROTEINTOT 7.7 09/03/2020    ALBUMIN 4.90 09/03/2020    BILITOT 0.3 09/03/2020    ALKPHOS 51 09/03/2020    AST 23 09/03/2020    ALT 28 09/03/2020     Lab Results   Component Value Date    TSH 3.470 09/03/2020 "       Reviewed his cxr - no sign of any nodules or abnormalities.    Assessment/Plan    1.  History of Hodgkin's lymphoma with acute history of chemotherapy and radiation.  Late effects of radiation is probably the more concerning issue.  There is a risk of a secondary malignancies such as lung cancer. I am going to get a cxr to see if there is any concerning findings.  Thyroid issues can also occur but after 30 years unlikely.           I spent a total of 15 minutes in direct patient care, greater than 10 minutes (greater than 50%) were spent in coordination of care, and counseling the patient regarding Hodgkin's lymphoma. Answered any questions patient had with the plan.      Rashi Rossi MD  [facility] Hematology and Oncology    Return in (Approximately): 1 year    Orders Placed This Encounter   Procedures   • XR Chest 2 View   • TSH   • Comprehensive Metabolic Panel   • CBC & Differential       9/4/2020         Please note that portions of this note may have been completed with a voice recognition program. Efforts were made to edit the dictations, but occasionally words are mistranscribed.

## 2021-09-08 ENCOUNTER — LAB (OUTPATIENT)
Dept: LAB | Facility: HOSPITAL | Age: 56
End: 2021-09-08

## 2021-09-08 ENCOUNTER — HOSPITAL ENCOUNTER (OUTPATIENT)
Dept: GENERAL RADIOLOGY | Facility: HOSPITAL | Age: 56
Discharge: HOME | End: 2021-09-08

## 2021-09-08 ENCOUNTER — OFFICE VISIT (OUTPATIENT)
Dept: ONCOLOGY | Facility: CLINIC | Age: 56
End: 2021-09-08

## 2021-09-08 VITALS
HEART RATE: 78 BPM | SYSTOLIC BLOOD PRESSURE: 149 MMHG | HEIGHT: 69 IN | BODY MASS INDEX: 32.07 KG/M2 | TEMPERATURE: 98.1 F | WEIGHT: 216.5 LBS | OXYGEN SATURATION: 96 % | DIASTOLIC BLOOD PRESSURE: 94 MMHG | RESPIRATION RATE: 16 BRPM

## 2021-09-08 DIAGNOSIS — Z85.71 HISTORY OF HODGKIN'S DISEASE: ICD-10-CM

## 2021-09-08 DIAGNOSIS — I10 ESSENTIAL HYPERTENSION: ICD-10-CM

## 2021-09-08 DIAGNOSIS — Z85.71 HISTORY OF HODGKIN'S DISEASE: Primary | ICD-10-CM

## 2021-09-08 LAB
ALBUMIN SERPL-MCNC: 4.6 G/DL (ref 3.5–5.2)
ALBUMIN/GLOB SERPL: 1.5 G/DL
ALP SERPL-CCNC: 69 U/L (ref 39–117)
ALT SERPL W P-5'-P-CCNC: 18 U/L (ref 1–41)
ANION GAP SERPL CALCULATED.3IONS-SCNC: 10 MMOL/L (ref 5–15)
AST SERPL-CCNC: 13 U/L (ref 1–40)
BILIRUB SERPL-MCNC: 0.3 MG/DL (ref 0–1.2)
BUN SERPL-MCNC: 11 MG/DL (ref 6–20)
BUN/CREAT SERPL: 9.4 (ref 7–25)
CALCIUM SPEC-SCNC: 9.3 MG/DL (ref 8.6–10.5)
CHLORIDE SERPL-SCNC: 100 MMOL/L (ref 98–107)
CO2 SERPL-SCNC: 25 MMOL/L (ref 22–29)
CREAT SERPL-MCNC: 1.17 MG/DL (ref 0.76–1.27)
ERYTHROCYTE [DISTWIDTH] IN BLOOD BY AUTOMATED COUNT: 13.8 % (ref 12.3–15.4)
GFR SERPL CREATININE-BSD FRML MDRD: 65 ML/MIN/1.73
GLOBULIN UR ELPH-MCNC: 3.1 GM/DL
GLUCOSE SERPL-MCNC: 117 MG/DL (ref 65–99)
HCT VFR BLD AUTO: 40.1 % (ref 37.5–51)
HGB BLD-MCNC: 13.1 G/DL (ref 13–17.7)
LYMPHOCYTES # BLD AUTO: 1.2 10*3/MM3 (ref 0.7–3.1)
LYMPHOCYTES NFR BLD AUTO: 13.4 % (ref 19.6–45.3)
MCH RBC QN AUTO: 30.1 PG (ref 26.6–33)
MCHC RBC AUTO-ENTMCNC: 32.7 G/DL (ref 31.5–35.7)
MCV RBC AUTO: 92.1 FL (ref 79–97)
MONOCYTES # BLD AUTO: 0.6 10*3/MM3 (ref 0.1–0.9)
MONOCYTES NFR BLD AUTO: 7.3 % (ref 5–12)
NEUTROPHILS NFR BLD AUTO: 6.8 10*3/MM3 (ref 1.7–7)
NEUTROPHILS NFR BLD AUTO: 79.3 % (ref 42.7–76)
PLATELET # BLD AUTO: 348 10*3/MM3 (ref 140–450)
PMV BLD AUTO: 6.3 FL (ref 6–12)
POTASSIUM SERPL-SCNC: 4.2 MMOL/L (ref 3.5–5.2)
PROT SERPL-MCNC: 7.7 G/DL (ref 6–8.5)
RBC # BLD AUTO: 4.35 10*6/MM3 (ref 4.14–5.8)
SODIUM SERPL-SCNC: 135 MMOL/L (ref 136–145)
TSH SERPL DL<=0.05 MIU/L-ACNC: 1.22 UIU/ML (ref 0.27–4.2)
WBC # BLD AUTO: 8.6 10*3/MM3 (ref 3.4–10.8)

## 2021-09-08 PROCEDURE — 84443 ASSAY THYROID STIM HORMONE: CPT

## 2021-09-08 PROCEDURE — 71046 X-RAY EXAM CHEST 2 VIEWS: CPT

## 2021-09-08 PROCEDURE — 99213 OFFICE O/P EST LOW 20 MIN: CPT | Performed by: INTERNAL MEDICINE

## 2021-09-08 PROCEDURE — 80053 COMPREHEN METABOLIC PANEL: CPT

## 2021-09-08 PROCEDURE — 85025 COMPLETE CBC W/AUTO DIFF WBC: CPT

## 2021-09-08 PROCEDURE — 36415 COLL VENOUS BLD VENIPUNCTURE: CPT

## 2021-09-08 NOTE — PROGRESS NOTES
PROBLEM LIST:    1.  History of Hodgkin lymphoma diagnosed in 1989-treated with systemic  Chemotherapy and radiation  2.  Chronic hypothyroidism.   3.  Gastroesophageal reflux disease.   4.  Chronic sleep disturbance.   5.  Extrinsic allergies  6.  Recurrent epididymitis  7.  History of craniotomy for a colloid cyst        REASON FOR VISIT: Follow up for hodgkins     HISTORY OF PRESENT ILLNESS:   56 y.o.  male presents today for follow-up.  He has a history of Hodgkin's lymphoma treated in 1989 by Dr. Tate at that time.  He underwent chemotherapy and radiation.  Clinically he is doing well.  He sees his primary care doctor Dr. Leos  between our visits.  He denies any major issues ongoing at this time.  He does have periods of recurrent epididymitis.  He is followed by Dr. Mantilla at times for that.  Denies any major issues with cough or fever or weight loss. No cough, no shortness of breath.  He does however have poorly controlled hypertension for now.    Past medical history, social history and family history was reviewed and unchanged from prior visit.    Review of Systems:    Review of Systems   Constitutional: Negative.    HENT:  Negative.    Eyes: Negative.    Respiratory: Negative.    Cardiovascular: Negative.    Gastrointestinal: Negative.    Endocrine: Negative.    Genitourinary: Negative.     Musculoskeletal: Negative.    Skin: Negative.    Neurological: Negative.    Hematological: Negative.    Psychiatric/Behavioral: Negative.       A comprehensive 14 point review of systems was performed and was negative except as mentioned.      Medications:        Current Outpatient Medications:   •  ALPRAZolam (XANAX) 1 MG tablet, Take 1 mg by mouth daily as needed for anxiety., Disp: , Rfl:   •  cetirizine (ZyrTEC) 10 MG tablet, Take 10 mg by mouth daily., Disp: , Rfl:   •  esomeprazole (NexIUM) 20 MG capsule, Take 20 mg by mouth daily., Disp: , Rfl:   •  levothyroxine (SYNTHROID, LEVOTHROID) 125 MCG tablet, TAKE  "1 TABLET BY MOUTH ONCE DAILY, Disp: 30 tablet, Rfl: 11  •  valsartan (DIOVAN) 160 MG tablet, TAKE 2 TABLETS PO QD, Disp: , Rfl:   •  zolpidem (AMBIEN) 10 MG tablet, Take 10 mg by mouth at night as needed for sleep., Disp: , Rfl:       ALLERGIES:    Allergies   Allergen Reactions   • Codeine Sulfate          Physical Exam    VITAL SIGNS:  /94   Pulse 78   Temp 98.1 °F (36.7 °C) (Temporal)   Resp 16   Ht 175.3 cm (69\")   Wt 98.2 kg (216 lb 8 oz)   SpO2 96%   BMI 31.97 kg/m²     Wt Readings from Last 3 Encounters:   09/08/21 98.2 kg (216 lb 8 oz)   09/03/20 95.7 kg (211 lb)   08/02/19 93 kg (205 lb)       Body mass index is 31.97 kg/m². Body surface area is 2.14 meters squared.         Performance Status: 0    General: well appearing, in no acute distress  HEENT: sclera anicteric, oropharynx clear, neck is supple  Lymphatics: no cervical, supraclavicular, or axillary adenopathy  Cardiovascular: regular rate and rhythm, no murmurs, rubs or gallops  Lungs: clear to auscultation bilaterally  Abdomen: soft, nontender, nondistended.  No palpable organomegaly  Extremities: no lower extremity edema  Skin: no rashes, lesions, bruising, or petechiae  Msk:  Shows no weakness of the large muscle groups  Psych: Mood is stable        RECENT LABS:    Lab Results   Component Value Date    HGB 13.1 09/08/2021    HCT 40.1 09/08/2021    MCV 92.1 09/08/2021     09/08/2021    WBC 8.60 09/08/2021    NEUTROABS 6.80 09/08/2021    LYMPHSABS 1.20 09/08/2021    MONOSABS 0.60 09/08/2021    EOSABS 0.19 09/03/2020    BASOSABS 0.12 09/03/2020       Lab Results   Component Value Date    GLUCOSE 105 (H) 09/03/2020    BUN 12 09/03/2020    CREATININE 1.05 09/03/2020     09/03/2020    K 4.2 09/03/2020     09/03/2020    CO2 26.0 09/03/2020    CALCIUM 9.4 09/03/2020    PROTEINTOT 7.7 09/03/2020    ALBUMIN 4.90 09/03/2020    BILITOT 0.3 09/03/2020    ALKPHOS 51 09/03/2020    AST 23 09/03/2020    ALT 28 09/03/2020     Lab " Results   Component Value Date    TSH 3.470 09/03/2020       Reviewed his cxr - no sign of any nodules or abnormalities.    Assessment/Plan    1.  History of Hodgkin's lymphoma with acute history of chemotherapy and radiation.  Late effects of radiation is probably the more concerning issue.  There is a risk of a secondary malignancies such as lung cancer.  I will plan on yearly chest x-rays for now.      2.  Poorly controlled hypertension.  I am going to check his renal duplex to make sure he does not have renal artery stenosis.  In spite of antihypertensive medicine the still running fairly high.            Rashi Rossi MD  [facility] and On[facility]    Return in (Approximately): 1 year    Orders Placed This Encounter   Procedures   • XR Chest PA & Lateral   • Comprehensive Metabolic Panel   • TSH   • CBC & Differential       9/8/2021         Please note that portions of this note may have been completed with a voice recognition program. Efforts were made to edit the dictations, but occasionally words are mistranscribed.

## 2022-09-06 DIAGNOSIS — Z85.71 HISTORY OF HODGKIN'S DISEASE: Primary | ICD-10-CM

## 2022-09-07 ENCOUNTER — OFFICE VISIT (OUTPATIENT)
Dept: ONCOLOGY | Facility: CLINIC | Age: 57
End: 2022-09-07

## 2022-09-07 ENCOUNTER — LAB (OUTPATIENT)
Dept: LAB | Facility: HOSPITAL | Age: 57
End: 2022-09-07

## 2022-09-07 ENCOUNTER — HOSPITAL ENCOUNTER (OUTPATIENT)
Dept: GENERAL RADIOLOGY | Facility: HOSPITAL | Age: 57
Discharge: HOME | End: 2022-09-07

## 2022-09-07 VITALS
OXYGEN SATURATION: 98 % | WEIGHT: 221 LBS | SYSTOLIC BLOOD PRESSURE: 184 MMHG | BODY MASS INDEX: 32.73 KG/M2 | TEMPERATURE: 97.7 F | RESPIRATION RATE: 16 BRPM | HEART RATE: 70 BPM | DIASTOLIC BLOOD PRESSURE: 97 MMHG | HEIGHT: 69 IN

## 2022-09-07 DIAGNOSIS — Z85.71 HISTORY OF HODGKIN'S DISEASE: ICD-10-CM

## 2022-09-07 DIAGNOSIS — Z85.71 HISTORY OF HODGKIN'S DISEASE: Primary | ICD-10-CM

## 2022-09-07 LAB
ALBUMIN SERPL-MCNC: 4.8 G/DL (ref 3.5–5.2)
ALBUMIN/GLOB SERPL: 1.5 G/DL
ALP SERPL-CCNC: 60 U/L (ref 39–117)
ALT SERPL W P-5'-P-CCNC: 23 U/L (ref 1–41)
ANION GAP SERPL CALCULATED.3IONS-SCNC: 11 MMOL/L (ref 5–15)
AST SERPL-CCNC: 16 U/L (ref 1–40)
BASOPHILS # BLD AUTO: 0.06 10*3/MM3 (ref 0–0.2)
BASOPHILS NFR BLD AUTO: 0.9 % (ref 0–1.5)
BILIRUB SERPL-MCNC: 0.3 MG/DL (ref 0–1.2)
BUN SERPL-MCNC: 13 MG/DL (ref 6–20)
BUN/CREAT SERPL: 12.1 (ref 7–25)
CALCIUM SPEC-SCNC: 9.4 MG/DL (ref 8.6–10.5)
CHLORIDE SERPL-SCNC: 100 MMOL/L (ref 98–107)
CO2 SERPL-SCNC: 25 MMOL/L (ref 22–29)
CREAT SERPL-MCNC: 1.07 MG/DL (ref 0.76–1.27)
DEPRECATED RDW RBC AUTO: 43.9 FL (ref 37–54)
EGFRCR SERPLBLD CKD-EPI 2021: 82 ML/MIN/1.73
EOSINOPHIL # BLD AUTO: 0.12 10*3/MM3 (ref 0–0.4)
EOSINOPHIL NFR BLD AUTO: 1.8 % (ref 0.3–6.2)
ERYTHROCYTE [DISTWIDTH] IN BLOOD BY AUTOMATED COUNT: 12.9 % (ref 12.3–15.4)
ERYTHROCYTE [SEDIMENTATION RATE] IN BLOOD: 5 MM/HR (ref 0–20)
GLOBULIN UR ELPH-MCNC: 3.1 GM/DL
GLUCOSE SERPL-MCNC: 88 MG/DL (ref 65–99)
HCT VFR BLD AUTO: 39.5 % (ref 37.5–51)
HGB BLD-MCNC: 12.9 G/DL (ref 13–17.7)
IMM GRANULOCYTES # BLD AUTO: 0.02 10*3/MM3 (ref 0–0.05)
IMM GRANULOCYTES NFR BLD AUTO: 0.3 % (ref 0–0.5)
LYMPHOCYTES # BLD AUTO: 1.07 10*3/MM3 (ref 0.7–3.1)
LYMPHOCYTES NFR BLD AUTO: 15.7 % (ref 19.6–45.3)
MCH RBC QN AUTO: 29.9 PG (ref 26.6–33)
MCHC RBC AUTO-ENTMCNC: 32.7 G/DL (ref 31.5–35.7)
MCV RBC AUTO: 91.4 FL (ref 79–97)
MONOCYTES # BLD AUTO: 0.58 10*3/MM3 (ref 0.1–0.9)
MONOCYTES NFR BLD AUTO: 8.5 % (ref 5–12)
NEUTROPHILS NFR BLD AUTO: 4.98 10*3/MM3 (ref 1.7–7)
NEUTROPHILS NFR BLD AUTO: 72.8 % (ref 42.7–76)
PLATELET # BLD AUTO: 309 10*3/MM3 (ref 140–450)
PMV BLD AUTO: 8.6 FL (ref 6–12)
POTASSIUM SERPL-SCNC: 3.9 MMOL/L (ref 3.5–5.2)
PROT SERPL-MCNC: 7.9 G/DL (ref 6–8.5)
RBC # BLD AUTO: 4.32 10*6/MM3 (ref 4.14–5.8)
SODIUM SERPL-SCNC: 136 MMOL/L (ref 136–145)
WBC NRBC COR # BLD: 6.83 10*3/MM3 (ref 3.4–10.8)

## 2022-09-07 PROCEDURE — 84153 ASSAY OF PSA TOTAL: CPT

## 2022-09-07 PROCEDURE — 85025 COMPLETE CBC W/AUTO DIFF WBC: CPT

## 2022-09-07 PROCEDURE — 99213 OFFICE O/P EST LOW 20 MIN: CPT | Performed by: INTERNAL MEDICINE

## 2022-09-07 PROCEDURE — 71046 X-RAY EXAM CHEST 2 VIEWS: CPT

## 2022-09-07 PROCEDURE — 85652 RBC SED RATE AUTOMATED: CPT

## 2022-09-07 PROCEDURE — 80053 COMPREHEN METABOLIC PANEL: CPT

## 2022-09-07 PROCEDURE — 36415 COLL VENOUS BLD VENIPUNCTURE: CPT

## 2022-09-07 RX ORDER — AMLODIPINE BESYLATE 5 MG/1
5 TABLET ORAL DAILY
COMMUNITY

## 2022-09-07 NOTE — PROGRESS NOTES
PROBLEM LIST:    1.  History of Hodgkin lymphoma diagnosed in 1989-treated with systemic  Chemotherapy and radiation  2.  Chronic hypothyroidism.   3.  Gastroesophageal reflux disease.   4.  Chronic sleep disturbance.   5.  Extrinsic allergies  6.  Recurrent epididymitis  7.  History of craniotomy for a colloid cyst        REASON FOR VISIT: Follow up for hodgkins     HISTORY OF PRESENT ILLNESS:   57 y.o.  male presents today for follow-up.  He has a history of Hodgkin's lymphoma treated in 1989 by Dr. Tate at that time.  He underwent chemotherapy and radiation.  Clinically he is doing well.  He sees his primary care doctor Dr. Leos  between our visits.  He denies any major issues ongoing at this time.  He continues to have significant issues with epididymitis.  He usually sees Dr. Mantilla for that.  He is concerned about his PSA.  But otherwise seems to be doing reasonably well.    Past medical history, social history and family history was reviewed and unchanged from prior visit.    Review of Systems:    Review of Systems   Constitutional: Negative.    HENT:  Negative.    Eyes: Negative.    Respiratory: Negative.    Cardiovascular: Negative.    Gastrointestinal: Negative.    Endocrine: Negative.    Genitourinary: Negative.     Musculoskeletal: Negative.    Skin: Negative.    Neurological: Negative.    Hematological: Negative.    Psychiatric/Behavioral: Negative.       A comprehensive 14 point review of systems was performed and was negative except as mentioned.      Medications:        Current Outpatient Medications:   •  ALPRAZolam (XANAX) 1 MG tablet, Take 1 mg by mouth daily as needed for anxiety., Disp: , Rfl:   •  amLODIPine (NORVASC) 5 MG tablet, Take 5 mg by mouth Daily., Disp: , Rfl:   •  cetirizine (ZyrTEC) 10 MG tablet, Take 10 mg by mouth daily., Disp: , Rfl:   •  esomeprazole (NexIUM) 20 MG capsule, Take 20 mg by mouth daily., Disp: , Rfl:   •  levothyroxine (SYNTHROID, LEVOTHROID) 125 MCG tablet,  "TAKE 1 TABLET BY MOUTH ONCE DAILY, Disp: 30 tablet, Rfl: 11  •  valsartan (DIOVAN) 160 MG tablet, TAKE 2 TABLETS PO QD, Disp: , Rfl:   •  zolpidem (AMBIEN) 10 MG tablet, Take 10 mg by mouth at night as needed for sleep., Disp: , Rfl:       ALLERGIES:    Allergies   Allergen Reactions   • Codeine Sulfate          Physical Exam    VITAL SIGNS:  BP (!) 184/97 Comment: LUE  Pulse 70   Temp 97.7 °F (36.5 °C) (Infrared)   Resp 16   Ht 175.3 cm (69\")   Wt 100 kg (221 lb)   SpO2 98% Comment: RA  BMI 32.64 kg/m²     Wt Readings from Last 3 Encounters:   09/07/22 100 kg (221 lb)   09/08/21 98.2 kg (216 lb 8 oz)   09/03/20 95.7 kg (211 lb)       Body mass index is 32.64 kg/m². Body surface area is 2.15 meters squared.         Performance Status: 0    General: well appearing, in no acute distress  HEENT: sclera anicteric, oropharynx clear, neck is supple  Lymphatics: no cervical, supraclavicular, or axillary adenopathy  Cardiovascular: regular rate and rhythm, no murmurs, rubs or gallops  Lungs: clear to auscultation bilaterally  Abdomen: soft, nontender, nondistended.  No palpable organomegaly  Extremities: no lower extremity edema  Skin: no rashes, lesions, bruising, or petechiae  Msk:  Shows no weakness of the large muscle groups  Psych: Mood is stable        RECENT LABS:    Lab Results   Component Value Date    HGB 13.1 09/08/2021    HCT 40.1 09/08/2021    MCV 92.1 09/08/2021     09/08/2021    WBC 8.60 09/08/2021    NEUTROABS 6.80 09/08/2021    LYMPHSABS 1.20 09/08/2021    MONOSABS 0.60 09/08/2021    EOSABS 0.19 09/03/2020    BASOSABS 0.12 09/03/2020       Lab Results   Component Value Date    GLUCOSE 117 (H) 09/08/2021    BUN 11 09/08/2021    CREATININE 1.17 09/08/2021     (L) 09/08/2021    K 4.2 09/08/2021     09/08/2021    CO2 25.0 09/08/2021    CALCIUM 9.3 09/08/2021    PROTEINTOT 7.7 09/08/2021    ALBUMIN 4.60 09/08/2021    BILITOT 0.3 09/08/2021    ALKPHOS 69 09/08/2021    AST 13 09/08/2021 "    ALT 18 09/08/2021     Lab Results   Component Value Date    TSH 1.220 09/08/2021           Assessment/Plan    1.  History of Hodgkin's lymphoma with acute history of chemotherapy and radiation.  Late effects of radiation is probably the more concerning issue.  There is a risk of a secondary malignancies such as lung cancer.  I will continue yearly chest x-rays for now.  The 1 from last year look clear.    2.  Recurrent epididymitis.  He has been on prolonged antibiotics in the past.  Follows up with urology.        Rashi Rossi MD  [facility] Hematology and On[facility]    Return in (Approximately): 1 year    Orders Placed This Encounter   Procedures   • XR Chest PA & Lateral   • PSA Diagnostic   • Comprehensive Metabolic Panel   • Sedimentation Rate   • CBC & Differential       9/7/2022         Please note that portions of this note may have been completed with a voice recognition program. Efforts were made to edit the dictations, but occasionally words are mistranscribed.

## 2022-09-08 LAB — PSA SERPL-MCNC: 0.35 NG/ML (ref 0–4)

## 2023-09-08 ENCOUNTER — OFFICE VISIT (OUTPATIENT)
Dept: ONCOLOGY | Facility: CLINIC | Age: 58
End: 2023-09-08
Payer: COMMERCIAL

## 2023-09-08 ENCOUNTER — LAB (OUTPATIENT)
Dept: LAB | Facility: HOSPITAL | Age: 58
End: 2023-09-08
Payer: COMMERCIAL

## 2023-09-08 ENCOUNTER — HOSPITAL ENCOUNTER (OUTPATIENT)
Dept: GENERAL RADIOLOGY | Facility: HOSPITAL | Age: 58
Discharge: HOME | End: 2023-09-08
Payer: COMMERCIAL

## 2023-09-08 VITALS
DIASTOLIC BLOOD PRESSURE: 86 MMHG | WEIGHT: 224 LBS | OXYGEN SATURATION: 93 % | HEIGHT: 69 IN | SYSTOLIC BLOOD PRESSURE: 139 MMHG | HEART RATE: 86 BPM | TEMPERATURE: 98 F | RESPIRATION RATE: 18 BRPM | BODY MASS INDEX: 33.18 KG/M2

## 2023-09-08 DIAGNOSIS — Z85.71 HISTORY OF HODGKIN'S DISEASE: Primary | ICD-10-CM

## 2023-09-08 DIAGNOSIS — Z85.71 HISTORY OF HODGKIN'S DISEASE: ICD-10-CM

## 2023-09-08 LAB
ALBUMIN SERPL-MCNC: 4.8 G/DL (ref 3.5–5.2)
ALBUMIN/GLOB SERPL: 1.5 G/DL
ALP SERPL-CCNC: 63 U/L (ref 39–117)
ALT SERPL W P-5'-P-CCNC: 29 U/L (ref 1–41)
ANION GAP SERPL CALCULATED.3IONS-SCNC: 12 MMOL/L (ref 5–15)
AST SERPL-CCNC: 22 U/L (ref 1–40)
BASOPHILS # BLD AUTO: 0.08 10*3/MM3 (ref 0–0.2)
BASOPHILS NFR BLD AUTO: 0.9 % (ref 0–1.5)
BILIRUB SERPL-MCNC: 0.3 MG/DL (ref 0–1.2)
BUN SERPL-MCNC: 14 MG/DL (ref 6–20)
BUN/CREAT SERPL: 13.2 (ref 7–25)
CALCIUM SPEC-SCNC: 9.7 MG/DL (ref 8.6–10.5)
CHLORIDE SERPL-SCNC: 98 MMOL/L (ref 98–107)
CO2 SERPL-SCNC: 24 MMOL/L (ref 22–29)
CREAT SERPL-MCNC: 1.06 MG/DL (ref 0.76–1.27)
DEPRECATED RDW RBC AUTO: 45.3 FL (ref 37–54)
EGFRCR SERPLBLD CKD-EPI 2021: 82.4 ML/MIN/1.73
EOSINOPHIL # BLD AUTO: 0.16 10*3/MM3 (ref 0–0.4)
EOSINOPHIL NFR BLD AUTO: 1.9 % (ref 0.3–6.2)
ERYTHROCYTE [DISTWIDTH] IN BLOOD BY AUTOMATED COUNT: 13.3 % (ref 12.3–15.4)
ERYTHROCYTE [SEDIMENTATION RATE] IN BLOOD: 11 MM/HR (ref 0–20)
GLOBULIN UR ELPH-MCNC: 3.3 GM/DL
GLUCOSE SERPL-MCNC: 98 MG/DL (ref 65–99)
HCT VFR BLD AUTO: 40.1 % (ref 37.5–51)
HGB BLD-MCNC: 13.4 G/DL (ref 13–17.7)
IMM GRANULOCYTES # BLD AUTO: 0.05 10*3/MM3 (ref 0–0.05)
IMM GRANULOCYTES NFR BLD AUTO: 0.6 % (ref 0–0.5)
LDH SERPL-CCNC: 187 U/L (ref 135–225)
LYMPHOCYTES # BLD AUTO: 1.06 10*3/MM3 (ref 0.7–3.1)
LYMPHOCYTES NFR BLD AUTO: 12.4 % (ref 19.6–45.3)
MCH RBC QN AUTO: 30.7 PG (ref 26.6–33)
MCHC RBC AUTO-ENTMCNC: 33.4 G/DL (ref 31.5–35.7)
MCV RBC AUTO: 91.8 FL (ref 79–97)
MONOCYTES # BLD AUTO: 0.73 10*3/MM3 (ref 0.1–0.9)
MONOCYTES NFR BLD AUTO: 8.5 % (ref 5–12)
NEUTROPHILS NFR BLD AUTO: 6.49 10*3/MM3 (ref 1.7–7)
NEUTROPHILS NFR BLD AUTO: 75.7 % (ref 42.7–76)
PLATELET # BLD AUTO: 330 10*3/MM3 (ref 140–450)
PMV BLD AUTO: 8.5 FL (ref 6–12)
POTASSIUM SERPL-SCNC: 4.3 MMOL/L (ref 3.5–5.2)
PROT SERPL-MCNC: 8.1 G/DL (ref 6–8.5)
RBC # BLD AUTO: 4.37 10*6/MM3 (ref 4.14–5.8)
SODIUM SERPL-SCNC: 134 MMOL/L (ref 136–145)
WBC NRBC COR # BLD: 8.57 10*3/MM3 (ref 3.4–10.8)

## 2023-09-08 PROCEDURE — 85652 RBC SED RATE AUTOMATED: CPT

## 2023-09-08 PROCEDURE — 36415 COLL VENOUS BLD VENIPUNCTURE: CPT

## 2023-09-08 PROCEDURE — 80053 COMPREHEN METABOLIC PANEL: CPT

## 2023-09-08 PROCEDURE — 83615 LACTATE (LD) (LDH) ENZYME: CPT

## 2023-09-08 PROCEDURE — 85025 COMPLETE CBC W/AUTO DIFF WBC: CPT

## 2023-09-08 PROCEDURE — 71046 X-RAY EXAM CHEST 2 VIEWS: CPT

## 2023-09-08 NOTE — PROGRESS NOTES
PROBLEM LIST:    1.  History of Hodgkin lymphoma diagnosed in 1989-treated with systemic  Chemotherapy and radiation  2.  Chronic hypothyroidism.   3.  Gastroesophageal reflux disease.   4.  Chronic sleep disturbance.   5.  Extrinsic allergies  6.  Recurrent epididymitis  7.  History of craniotomy for a colloid cyst        REASON FOR VISIT: Follow up for hodgkins     HISTORY OF PRESENT ILLNESS:   58 y.o.  male presents today for follow-up.  He has a history of Hodgkin's lymphoma treated in 1989 by Dr. Tate at that time.  He underwent chemotherapy and radiation.  Clinically he is doing well.  He sees his primary care doctor Dr. Leos  between our visits.  His father was recently diagnosed with what appears to be stage IV colon cancer.  So he has had some issues dealing with that.  He is having considerable fatigue.  From a health standpoint he has had not had any illnesses recently.    Past medical history, social history and family history was reviewed and unchanged from prior visit.    Review of Systems:    Review of Systems   Constitutional: Negative.    HENT:  Negative.     Eyes: Negative.    Respiratory: Negative.     Cardiovascular: Negative.    Gastrointestinal: Negative.    Endocrine: Negative.    Genitourinary: Negative.     Musculoskeletal: Negative.    Skin: Negative.    Neurological: Negative.    Hematological: Negative.    Psychiatric/Behavioral: Negative.      A comprehensive 14 point review of systems was performed and was negative except as mentioned.      Medications:        Current Outpatient Medications:     ALPRAZolam (XANAX) 1 MG tablet, Take 1 tablet by mouth Daily As Needed for Anxiety., Disp: , Rfl:     amLODIPine (NORVASC) 5 MG tablet, Take 1 tablet by mouth Daily., Disp: , Rfl:     cetirizine (ZyrTEC) 10 MG tablet, Take 1 tablet by mouth Daily., Disp: , Rfl:     esomeprazole (NexIUM) 20 MG capsule, Take 1 capsule by mouth Daily., Disp: , Rfl:     levothyroxine (SYNTHROID, LEVOTHROID) 125  "MCG tablet, TAKE 1 TABLET BY MOUTH ONCE DAILY, Disp: 30 tablet, Rfl: 11    valsartan (DIOVAN) 160 MG tablet, TAKE 2 TABLETS PO QD, Disp: , Rfl:     zolpidem (AMBIEN) 10 MG tablet, Take 1 tablet by mouth At Night As Needed for Sleep., Disp: , Rfl:       ALLERGIES:    Allergies   Allergen Reactions    Codeine Sulfate          Physical Exam    VITAL SIGNS:  /86 Comment: LUE  Pulse 86   Temp 98 °F (36.7 °C) (Infrared)   Resp 18   Ht 175.3 cm (69\")   Wt 102 kg (224 lb)   SpO2 93% Comment: RA  BMI 33.08 kg/m²     Wt Readings from Last 3 Encounters:   09/08/23 102 kg (224 lb)   09/07/22 100 kg (221 lb)   09/08/21 98.2 kg (216 lb 8 oz)       Body mass index is 33.08 kg/m². Body surface area is 2.17 meters squared.         Performance Status: 0    General: well appearing, in no acute distress  HEENT: sclera anicteric, oropharynx clear, neck is supple  Lymphatics: no cervical, supraclavicular, or axillary adenopathy  Cardiovascular: regular rate and rhythm, no murmurs, rubs or gallops  Lungs: clear to auscultation bilaterally  Abdomen: soft, nontender, nondistended.  No palpable organomegaly  Extremities: no lower extremity edema  Skin: no rashes, lesions, bruising, or petechiae  Msk:  Shows no weakness of the large muscle groups  Psych: Mood is stable        RECENT LABS:    Lab Results   Component Value Date    HGB 12.9 (L) 09/07/2022    HCT 39.5 09/07/2022    MCV 91.4 09/07/2022     09/07/2022    WBC 6.83 09/07/2022    NEUTROABS 4.98 09/07/2022    LYMPHSABS 1.07 09/07/2022    MONOSABS 0.58 09/07/2022    EOSABS 0.12 09/07/2022    BASOSABS 0.06 09/07/2022       Lab Results   Component Value Date    GLUCOSE 88 09/07/2022    BUN 13 09/07/2022    CREATININE 1.07 09/07/2022     09/07/2022    K 3.9 09/07/2022     09/07/2022    CO2 25.0 09/07/2022    CALCIUM 9.4 09/07/2022    PROTEINTOT 7.9 09/07/2022    ALBUMIN 4.80 09/07/2022    BILITOT 0.3 09/07/2022    ALKPHOS 60 09/07/2022    AST 16 09/07/2022 "    ALT 23 09/07/2022     Lab Results   Component Value Date    TSH 1.220 09/08/2021           Assessment/Plan    1.  History of Hodgkin's lymphoma with acute history of chemotherapy and radiation.  Late effects of radiation is probably the more concerning issue.  There is a risk of a secondary malignancies such as lung cancer.  I will continue yearly chest x-rays for now.  Plan for labs today and chest x-ray.  Return to clinic in 1 year.    2.  Recurrent epididymitis.  He has been on prolonged antibiotics in the past.  Follows up with urology.        Rashi Rossi MD  [facility] On[facility]    Return in (Approximately): 1 year    Orders Placed This Encounter   Procedures    XR Chest PA & Lateral    Comprehensive Metabolic Panel    Lactate Dehydrogenase    Sedimentation Rate    CBC & Differential       9/8/2023         Please note that portions of this note may have been completed with a voice recognition program. Efforts were made to edit the dictations, but occasionally words are mistranscribed.

## 2024-04-26 ENCOUNTER — HOSPITAL ENCOUNTER (OUTPATIENT)
Dept: GENERAL RADIOLOGY | Facility: HOSPITAL | Age: 59
Discharge: HOME | End: 2024-04-26
Payer: COMMERCIAL

## 2024-04-26 ENCOUNTER — OFFICE VISIT (OUTPATIENT)
Dept: ONCOLOGY | Facility: CLINIC | Age: 59
End: 2024-04-26
Payer: COMMERCIAL

## 2024-04-26 ENCOUNTER — LAB (OUTPATIENT)
Dept: LAB | Facility: HOSPITAL | Age: 59
End: 2024-04-26
Payer: COMMERCIAL

## 2024-04-26 VITALS
HEART RATE: 80 BPM | BODY MASS INDEX: 34.1 KG/M2 | WEIGHT: 230.9 LBS | SYSTOLIC BLOOD PRESSURE: 122 MMHG | OXYGEN SATURATION: 96 % | DIASTOLIC BLOOD PRESSURE: 84 MMHG | TEMPERATURE: 98.2 F

## 2024-04-26 DIAGNOSIS — I31.39 PERICARDIAL EFFUSION: ICD-10-CM

## 2024-04-26 DIAGNOSIS — Z85.71 HISTORY OF HODGKIN'S DISEASE: Primary | ICD-10-CM

## 2024-04-26 DIAGNOSIS — Z85.71 HISTORY OF HODGKIN'S DISEASE: ICD-10-CM

## 2024-04-26 LAB
CRP SERPL-MCNC: 0.36 MG/DL (ref 0–0.5)
ERYTHROCYTE [SEDIMENTATION RATE] IN BLOOD: 16 MM/HR (ref 0–20)
NT-PROBNP SERPL-MCNC: 162.4 PG/ML (ref 0–900)
T4 FREE SERPL-MCNC: 1.27 NG/DL (ref 0.92–1.68)
TROPONIN T SERPL HS-MCNC: 10 NG/L
TSH SERPL DL<=0.05 MIU/L-ACNC: 3.24 UIU/ML (ref 0.27–4.2)

## 2024-04-26 PROCEDURE — 86140 C-REACTIVE PROTEIN: CPT

## 2024-04-26 PROCEDURE — 84484 ASSAY OF TROPONIN QUANT: CPT

## 2024-04-26 PROCEDURE — 85652 RBC SED RATE AUTOMATED: CPT

## 2024-04-26 PROCEDURE — 84443 ASSAY THYROID STIM HORMONE: CPT

## 2024-04-26 PROCEDURE — 83880 ASSAY OF NATRIURETIC PEPTIDE: CPT

## 2024-04-26 PROCEDURE — 71046 X-RAY EXAM CHEST 2 VIEWS: CPT

## 2024-04-26 PROCEDURE — 84439 ASSAY OF FREE THYROXINE: CPT

## 2024-04-26 PROCEDURE — 36415 COLL VENOUS BLD VENIPUNCTURE: CPT

## 2024-04-26 NOTE — PROGRESS NOTES
PROBLEM LIST:    1.  History of Hodgkin lymphoma diagnosed in 1989-treated with systemic  Chemotherapy and radiation  2.  Chronic hypothyroidism.   3.  Gastroesophageal reflux disease.   4.  Chronic sleep disturbance.   5.  Extrinsic allergies  6.  Recurrent epididymitis  7.  History of craniotomy for a colloid cyst        REASON FOR VISIT: Pericardial effusion    HISTORY OF PRESENT ILLNESS:   58 y.o.  male presents today for follow-up.  Recently she he underwent a routine echocardiogram for his history of mantle radiation.  He was having some shortness of breath.  He was found to have moderate to large sized pericardial effusion with no sign of tamponade.  He is relatively asymptomatic.  The doctors at [facility] will suggested window procedure though he does not want to undergo that process.  He saw Dr. Meyer who recommended repeat echo in 6 months.  Denies any issues with pain.  Does have some shortness of breath though may be due to deconditioning.    Past medical history, social history and family history was reviewed and unchanged from prior visit.    Review of Systems:    Review of Systems   Constitutional: Negative.    HENT:  Negative.     Eyes: Negative.    Respiratory:  Positive for shortness of breath.    Cardiovascular: Negative.    Gastrointestinal: Negative.    Endocrine: Negative.    Genitourinary: Negative.     Musculoskeletal: Negative.    Skin: Negative.    Neurological: Negative.    Hematological: Negative.    Psychiatric/Behavioral: Negative.        A comprehensive 14 point review of systems was performed and was negative except as mentioned.      Medications:        Current Outpatient Medications:     ALPRAZolam (XANAX) 1 MG tablet, Take 1 tablet by mouth Daily As Needed for Anxiety., Disp: , Rfl:     amLODIPine (NORVASC) 5 MG tablet, Take 1 tablet by mouth Daily., Disp: , Rfl:     cetirizine (ZyrTEC) 10 MG tablet, Take 1 tablet by mouth Daily., Disp: , Rfl:     esomeprazole (NexIUM) 20 MG capsule,  Take 1 capsule by mouth Daily., Disp: , Rfl:     levothyroxine (SYNTHROID, LEVOTHROID) 125 MCG tablet, TAKE 1 TABLET BY MOUTH ONCE DAILY, Disp: 30 tablet, Rfl: 11    metoprolol tartrate (LOPRESSOR) 25 MG tablet, Take 0.5 tablets by mouth., Disp: , Rfl:     valsartan (DIOVAN) 160 MG tablet, TAKE 2 TABLETS PO QD, Disp: , Rfl:     zolpidem (AMBIEN) 10 MG tablet, Take 1 tablet by mouth At Night As Needed for Sleep., Disp: , Rfl:       ALLERGIES:    Allergies   Allergen Reactions    Codeine Sulfate          Physical Exam    VITAL SIGNS:  /84   Pulse 80   Temp 98.2 °F (36.8 °C)   Wt 105 kg (230 lb 14.4 oz)   SpO2 96%   BMI 34.10 kg/m²     Wt Readings from Last 3 Encounters:   04/26/24 105 kg (230 lb 14.4 oz)   09/08/23 102 kg (224 lb)   09/07/22 100 kg (221 lb)       Body mass index is 34.1 kg/m². Body surface area is 2.2 meters squared.         Performance Status: 0    General: well appearing, in no acute distress  HEENT: sclera anicteric, oropharynx clear, neck is supple  Lymphatics: no cervical, supraclavicular, or axillary adenopathy  Cardiovascular: regular rate and rhythm, no murmurs, rubs or gallops  Lungs: clear to auscultation bilaterally  Abdomen: soft, nontender, nondistended.  No palpable organomegaly  Extremities: no lower extremity edema  Skin: no rashes, lesions, bruising, or petechiae  Msk:  Shows no weakness of the large muscle groups  Psych: Mood is stable        RECENT LABS:    Lab Results   Component Value Date    HGB 13.4 09/08/2023    HCT 40.1 09/08/2023    MCV 91.8 09/08/2023     09/08/2023    WBC 8.57 09/08/2023    NEUTROABS 6.49 09/08/2023    LYMPHSABS 1.06 09/08/2023    MONOSABS 0.73 09/08/2023    EOSABS 0.16 09/08/2023    BASOSABS 0.08 09/08/2023       Lab Results   Component Value Date    GLUCOSE 98 09/08/2023    BUN 14 09/08/2023    CREATININE 1.06 09/08/2023     (L) 09/08/2023    K 4.3 09/08/2023    CL 98 09/08/2023    CO2 24.0 09/08/2023    CALCIUM 9.7 09/08/2023     PROTEINTOT 8.1 09/08/2023    ALBUMIN 4.8 09/08/2023    BILITOT 0.3 09/08/2023    ALKPHOS 63 09/08/2023    AST 22 09/08/2023    ALT 29 09/08/2023     Lab Results   Component Value Date    TSH 1.220 09/08/2021           Assessment/Plan    1.  Pericardial effusion possibly related to history of Hodgkin's.  It would be very close to the time that you would see some late effects of radiation.  I will get a chest x-ray today along with some cardiac labs to make sure we are not missing anything else.  I may consider a CT scan with our next visit.  I like to see what the chest x-ray shows and compared to the prior chest x-rays that we have in our system.  For now no intervention since he is relatively asymptomatic.    1.  History of Hodgkin's lymphoma with acute history of chemotherapy and radiation.  Late effects of radiation is probably the more concerning issue.  There is a risk of a secondary malignancies such as lung cancer.  I.    2.  Recurrent epididymitis.  He has been on prolonged antibiotics in the past.  Follows up with urology.        Rashi Rossi MD  [facility] Hematology and Oncology         Orders Placed This Encounter   Procedures    XR Chest PA & Lateral    XR Chest PA & Lateral    Troponin    Sedimentation Rate    C-reactive Protein    B-type natriuretic peptide    TSH    T4, free       4/26/2024

## 2024-05-28 ENCOUNTER — TELEPHONE (OUTPATIENT)
Dept: CARDIAC SURGERY | Facility: CLINIC | Age: 59
End: 2024-05-28
Payer: COMMERCIAL

## 2024-06-03 ENCOUNTER — OFFICE VISIT (OUTPATIENT)
Dept: CARDIAC SURGERY | Facility: CLINIC | Age: 59
End: 2024-06-03
Payer: COMMERCIAL

## 2024-06-03 VITALS
HEART RATE: 81 BPM | OXYGEN SATURATION: 95 % | WEIGHT: 222 LBS | BODY MASS INDEX: 32.88 KG/M2 | DIASTOLIC BLOOD PRESSURE: 76 MMHG | SYSTOLIC BLOOD PRESSURE: 118 MMHG | HEIGHT: 69 IN | TEMPERATURE: 97.9 F

## 2024-06-03 DIAGNOSIS — I31.39 PERICARDIAL EFFUSION (NONINFLAMMATORY): Primary | ICD-10-CM

## 2024-06-03 RX ORDER — TIRZEPATIDE 2.5 MG/.5ML
2.5 INJECTION, SOLUTION SUBCUTANEOUS WEEKLY
COMMUNITY

## 2024-06-03 RX ORDER — COLCHICINE 0.6 MG/1
0.6 TABLET ORAL 2 TIMES DAILY
COMMUNITY

## 2024-06-03 NOTE — PROGRESS NOTES
06/03/2024  Patient Information  Angel Campbell                                                                                          [address] [facility] [place] [facility] [place] [address]   1965  'PCP/Referring Physician'  Talon Leos, DO  [phone]  No ref. provider found    Chief Complaint   Patient presents with    Pericardial Effusion     New patient per Dr. Hollis for pericardial effusion        History of Present Illness:   The patient is a 59-year-old male who was referred to me to evaluate a pericardial effusion.  Apparently this patient first had an echocardiogram over 4 or 5 months ago which demonstrated a small to moderate pericardial effusion.  At that time the surgeon in [place] had recommended an urgent pericardial window.  The patient sought out a different opinion and underwent a second echocardiogram a month or so later which demonstrated no significant change.  He has had his most recent echocardiogram with Dr. Meyer in [place] which also apparently demonstrates no significant increase in the size of this effusion.  It is therefore been stable for at least 4 or 5 months.  He does have a history of Hodgkin's lymphoma for which he underwent radiation in the past and he is followed by [facility] for this.  The patient does not report any unusual or significant changes in shortness of breath.  He says he does stay fairly active and has not noticed an appreciable change in any symptomatology.      Patient Active Problem List   Diagnosis    History of Hodgkin's disease    Hypothyroidism    GERD (gastroesophageal reflux disease)    Sleep disturbance    Allergic    Skin lesion    Pericardial effusion     Past Medical History:   Diagnosis Date    Allergic rhinitis     GERD (gastroesophageal reflux disease)     Hodgkin lymphoma     Hodgkin's disease     Hypothyroidism      Past Surgical History:   Procedure Laterality Date    SKIN CANCER EXCISION  07/2019    Back- Basal Cell    SKIN CANCER  EXCISION  2022    Back of neck       Current Outpatient Medications:     ALPRAZolam (XANAX) 1 MG tablet, Take 1 tablet by mouth Daily As Needed for Anxiety., Disp: , Rfl:     amLODIPine (NORVASC) 5 MG tablet, Take 1 tablet by mouth Daily., Disp: , Rfl:     cetirizine (ZyrTEC) 10 MG tablet, Take 1 tablet by mouth Daily., Disp: , Rfl:     colchicine 0.6 MG tablet, Take 1 tablet by mouth 2 (Two) Times a Day., Disp: , Rfl:     esomeprazole (NexIUM) 20 MG capsule, Take 1 capsule by mouth Daily., Disp: , Rfl:     levothyroxine (SYNTHROID, LEVOTHROID) 125 MCG tablet, TAKE 1 TABLET BY MOUTH ONCE DAILY, Disp: 30 tablet, Rfl: 11    metoprolol tartrate (LOPRESSOR) 25 MG tablet, Take 0.5 tablets by mouth., Disp: , Rfl:     Tirzepatide-Weight Management (Zepbound) 2.5 MG/0.5ML solution auto-injector, Inject 0.5 mL under the skin into the appropriate area as directed 1 (One) Time Per Week., Disp: , Rfl:     valsartan (DIOVAN) 160 MG tablet, TAKE 2 TABLETS PO QD, Disp: , Rfl:     zolpidem (AMBIEN) 10 MG tablet, Take 1 tablet by mouth At Night As Needed for Sleep., Disp: , Rfl:   Allergies   Allergen Reactions    Codeine Sulfate Other (See Comments)     Childhood reaction, reported by mother. Patient does not remember reaction      Social History     Socioeconomic History    Marital status:    Tobacco Use    Smoking status: Never    Smokeless tobacco: Never   Vaping Use    Vaping status: Never Used   Substance and Sexual Activity    Alcohol use: No    Drug use: No    Sexual activity: Defer     Family History   Problem Relation Age of Onset    Breast cancer Mother     Colon cancer Father      Review of Systems   Constitutional: Negative for chills, fever, malaise/fatigue, night sweats and weight loss.   HENT:  Negative for hearing loss, odynophagia and sore throat.    Cardiovascular:  Positive for dyspnea on exertion (in the morning time during ADLs). Negative for chest pain, leg swelling, orthopnea and palpitations.  "  Respiratory:  Negative for cough and hemoptysis.    Endocrine: Negative for cold intolerance, heat intolerance, polydipsia, polyphagia and polyuria.   Hematologic/Lymphatic: Does not bruise/bleed easily.   Skin:  Negative for itching and rash.   Musculoskeletal:  Negative for joint pain, joint swelling and myalgias.   Gastrointestinal:  Negative for abdominal pain, constipation, diarrhea, hematemesis, hematochezia, melena, nausea and vomiting.   Genitourinary:  Negative for dysuria, frequency and hematuria.   Neurological:  Negative for focal weakness, headaches, numbness and seizures.   Psychiatric/Behavioral:  Negative for suicidal ideas.    All other systems reviewed and are negative.    Vitals:    06/03/24 0706   BP: 118/76   BP Location: Right arm   Patient Position: Sitting   Pulse: 81   Temp: 97.9 °F (36.6 °C)   SpO2: 95%   Weight: 101 kg (222 lb)   Height: 175.3 cm (69\")      Physical Exam   CONSTITUTIONAL: Alert and conversant, Well dressed, Well nourished, No acute distress  EYES: Sclera clean, Anicteric, Pupils equal  ENT: No nasal deviation, Trachea midline  NECK: No neck masses, Supple  LUNGS: No wheezing, Cough, non-congested  HEART: No rubs, No murmurs  GASTROINTESTINAL: Soft, non-distended, No masses, Non tender  to palpation, normal bowel sounds  NEURO: No motor deficits, No sensory deficits, Cranial Nerves 2 through 12 grossly intact  PSYCHIATRIC: Oriented to person, place and time, No memory deficits, Mood appropriate  VASCULAR: No carotid bruits, Femoral pulses palpable and symmetric  MUSKULOSKELETAL: No extremity trauma or extremity asymmetry    The ROS, past medical history, surgical history, family history, social history, and vitals were reviewed by myself and corrected as needed.      Labs/Imaging:  I have reviewed all the reports from Dr. Meyer well as notes from oncology in the [facility] record. The patient has never smoked therefore no smoking cessation was discussed. The patient does " not have an advance directive on file at this time.     Assessment/Plan:   The patient is a 59-year-old male who apparently has a small stable pericardial effusion that has been present for a number of months.  At this juncture, it has been present for so long that I do not think there would be any advantage to draining this fluid at this time.  I have told him to continue to follow-up with Dr. Meyer and should he notice a change in his symptomatology he will contact Dr. Meyer or myself sooner.    Patient Active Problem List   Diagnosis    History of Hodgkin's disease    Hypothyroidism    GERD (gastroesophageal reflux disease)    Sleep disturbance    Allergic    Skin lesion    Pericardial effusion       CC: Talon Leos, DO Melanie Morel editing for Carlos Hopson MD       I, Carlos Hopson MD, have read and agree with the editing done by Melanie Morel, .

## 2024-08-13 ENCOUNTER — TELEPHONE (OUTPATIENT)
Dept: ONCOLOGY | Facility: CLINIC | Age: 59
End: 2024-08-13
Payer: COMMERCIAL

## 2024-08-13 NOTE — TELEPHONE ENCOUNTER
Caller: Angel Campbell    Relationship: Self    Best call back number: [phone]     What is the best time to reach you: ASAP    Who are you requesting to speak with (clinical staff, provider,  specific staff member): SCHEDULING        What was the call regarding: PLEASE CALL PT TO RESCHEDULE HIS FOLLOW UP, HUB UNABLE TO SCHEDULE WITHIN TIMEFRAME.  PT WOULD LIKE THE [facility] LOCATION IF POSSIBLE, BUT IF NOT, THEN [facility] IS FINE.  WOULD LIKE AN AUG. APPT IF ANY AVAILABLE, BUT CAN'T DO AUG.23 OR AUG.29.  HE ALSO HAS SEVERAL APPTS ALREADY SCHEDULED IN SEPT. SO HE WILL NEED A CALL TO RESCHEDULE THIS APPT EITHER WAY.

## 2024-08-19 ENCOUNTER — OFFICE VISIT (OUTPATIENT)
Age: 59
End: 2024-08-19
Payer: COMMERCIAL

## 2024-08-19 VITALS
HEART RATE: 58 BPM | TEMPERATURE: 97.3 F | OXYGEN SATURATION: 95 % | DIASTOLIC BLOOD PRESSURE: 88 MMHG | HEIGHT: 69 IN | RESPIRATION RATE: 16 BRPM | SYSTOLIC BLOOD PRESSURE: 138 MMHG | BODY MASS INDEX: 30.81 KG/M2 | WEIGHT: 208 LBS

## 2024-08-19 DIAGNOSIS — I31.39 PERICARDIAL EFFUSION: ICD-10-CM

## 2024-08-19 DIAGNOSIS — Z85.71 HISTORY OF HODGKIN'S DISEASE: Primary | ICD-10-CM

## 2024-08-19 PROCEDURE — 99214 OFFICE O/P EST MOD 30 MIN: CPT | Performed by: NURSE PRACTITIONER

## 2024-08-19 RX ORDER — LANOLIN ALCOHOL/MO/W.PET/CERES
1 CREAM (GRAM) TOPICAL DAILY
COMMUNITY
Start: 2024-07-23

## 2024-08-19 NOTE — PROGRESS NOTES
PROBLEM LIST:    1.  History of Hodgkin lymphoma diagnosed in 1989-treated with systemic  Chemotherapy and radiation  2.  Chronic hypothyroidism.   3.  Gastroesophageal reflux disease.   4.  Chronic sleep disturbance.   5.  Extrinsic allergies  6.  Recurrent epididymitis  7.  History of craniotomy for a colloid cyst  8.  Pericardial effusion        REASON FOR VISIT: Pericardial effusion    HISTORY OF PRESENT ILLNESS:   59 y.o.  male presents today for follow-up.  Last seen in office on 4/26/24 with Dr. Jacobs.  Prior to last visit, pt with SOA  and findings of mod to large sized pericardial effusion with no tamponade on Echo done in [place] with plans for pericardial window.  Seen locally by Dr. Meyer for 2nd opinion and sent to Dr. Hopson for eval with no plans for surgical intervention with effusion stability and pt relatively asymptomatic.  Inflammatory markers/troponin/BNP/CXR stable with Dr. Jacobs.  Plans for follow up echo next month with Dr. Meyer.  Pt is doing well.  Denies any significant SOA/Orthopnea/activity intolerance.  Denies any pain or lymphadenopathy.  He has had 20 lbs intentional weight loss with initiation Zepbound.      Past medical history, social history and family history was reviewed and unchanged from prior visit.    Review of Systems:    Review of Systems   Constitutional: Negative.    HENT:  Negative.     Eyes: Negative.    Respiratory:  Positive for shortness of breath.    Cardiovascular: Negative.    Gastrointestinal: Negative.    Endocrine: Negative.    Genitourinary: Negative.     Musculoskeletal: Negative.    Skin: Negative.    Neurological: Negative.    Hematological: Negative.    Psychiatric/Behavioral: Negative.        A comprehensive 14 point review of systems was performed and was negative except as mentioned.      Medications:        Current Outpatient Medications:     vitamin B-12 (CYANOCOBALAMIN) 1000 MCG tablet, Take 1 tablet by mouth Daily., Disp: , Rfl:     ALPRAZolam  "(XANAX) 1 MG tablet, Take 1 tablet by mouth Daily As Needed for Anxiety., Disp: , Rfl:     amLODIPine (NORVASC) 5 MG tablet, Take 0.5 tablets by mouth Daily. 2.5 mg 3 times weekly, Disp: , Rfl:     cetirizine (ZyrTEC) 10 MG tablet, Take 1 tablet by mouth Daily., Disp: , Rfl:     esomeprazole (NexIUM) 20 MG capsule, Take 1 capsule by mouth Daily., Disp: , Rfl:     levothyroxine (SYNTHROID, LEVOTHROID) 125 MCG tablet, TAKE 1 TABLET BY MOUTH ONCE DAILY, Disp: 30 tablet, Rfl: 11    metoprolol tartrate (LOPRESSOR) 25 MG tablet, Take 0.5 tablets by mouth. 12.5 mg bid, Disp: , Rfl:     Tirzepatide-Weight Management (Zepbound) 2.5 MG/0.5ML solution auto-injector, Inject 0.5 mL under the skin into the appropriate area as directed 1 (One) Time Per Week., Disp: , Rfl:     valsartan (DIOVAN) 160 MG tablet, TAKE 2 TABLETS PO QD, Disp: , Rfl:     zolpidem (AMBIEN) 10 MG tablet, Take 1 tablet by mouth At Night As Needed for Sleep., Disp: , Rfl:       ALLERGIES:    Allergies   Allergen Reactions    Codeine Sulfate Other (See Comments)     Childhood reaction, reported by mother. Patient does not remember reaction          Physical Exam    VITAL SIGNS:  /88   Pulse 58   Temp 97.3 °F (36.3 °C)   Resp 16   Ht 175.3 cm (69\")   Wt 94.3 kg (208 lb)   SpO2 95%   BMI 30.72 kg/m²     Wt Readings from Last 3 Encounters:   08/19/24 94.3 kg (208 lb)   06/03/24 101 kg (222 lb)   04/26/24 105 kg (230 lb 14.4 oz)       Body mass index is 30.72 kg/m². Body surface area is 2.1 meters squared.         Performance Status: 0    General: well appearing, in no acute distress  HEENT: sclera anicteric, oropharynx clear, neck is supple  Lymphatics: no cervical, supraclavicular, or axillary adenopathy  Cardiovascular: regular rate and rhythm, no rubs or gallops, 2/6 JOSE  Lungs: clear to auscultation bilaterally  Abdomen: soft, nontender, nondistended.  No palpable organomegaly  Extremities: no lower extremity edema  Skin: no rashes, lesions, " bruising, or petechiae  Msk:  Shows no weakness of the large muscle groups  Psych: Mood is stable        RECENT LABS:    Lab Results   Component Value Date    HGB 13.4 09/08/2023    HCT 40.1 09/08/2023    MCV 91.8 09/08/2023     09/08/2023    WBC 8.57 09/08/2023    NEUTROABS 6.49 09/08/2023    LYMPHSABS 1.06 09/08/2023    MONOSABS 0.73 09/08/2023    EOSABS 0.16 09/08/2023    BASOSABS 0.08 09/08/2023       Lab Results   Component Value Date    GLUCOSE 98 09/08/2023    BUN 14 09/08/2023    CREATININE 1.06 09/08/2023     (L) 09/08/2023    K 4.3 09/08/2023    CL 98 09/08/2023    CO2 24.0 09/08/2023    CALCIUM 9.7 09/08/2023    PROTEINTOT 8.1 09/08/2023    ALBUMIN 4.8 09/08/2023    BILITOT 0.3 09/08/2023    ALKPHOS 63 09/08/2023    AST 22 09/08/2023    ALT 29 09/08/2023     Lab Results   Component Value Date    TSH 3.240 04/26/2024   Free T4 1.27        Assessment/Plan    1.  Pericardial effusion possibly related to history of Hodgkin's.  Continue to monitor for late effects of radiation.  Recent labs/CXR stable.  Not current surgical candidate with stable effusion/asymptomatic.  Moderate on 5/17/24 Echo with moderate AS.  Plans for repeat Echo in September with Dr. Meyer.      2.  History of Hodgkin's lymphoma with acute history of chemotherapy and radiation.  Late effects of radiation is probably the more concerning issue.  There is a risk of a secondary malignancies such as lung cancer.  I.    3.  Recurrent epididymitis.  He has been on prolonged antibiotics in the past.  Follows up with urology.    Follow up in 2 months with video visit after seeing Dr. Meyer.  If Echo remains stable, will plan to return to annual follow up with labs.    I spent 31 minutes caring for Angel.  This includes time spent by me in the following activities:  preparing for visit, reviewing tests, obtaining history, performing physical exam, education, ordering necessary medications/testing and documenting in the medical  record.    ANYA Wilson  [facility]         No orders of the defined types were placed in this encounter.      8/19/2024

## 2024-10-21 ENCOUNTER — TELEMEDICINE (OUTPATIENT)
Age: 59
End: 2024-10-21
Payer: COMMERCIAL

## 2024-10-21 ENCOUNTER — TELEPHONE (OUTPATIENT)
Age: 59
End: 2024-10-21

## 2024-10-21 VITALS — OXYGEN SATURATION: 95 % | HEART RATE: 74 BPM

## 2024-10-21 DIAGNOSIS — I31.39 PERICARDIAL EFFUSION: Primary | ICD-10-CM

## 2024-10-21 DIAGNOSIS — Z85.71 HISTORY OF HODGKIN'S DISEASE: ICD-10-CM

## 2024-10-21 NOTE — TELEPHONE ENCOUNTER
Caller: Angel Campbell    Relationship: Self    Best call back number: [phone]      What was the call regarding: PATIENT WANTED US TO REQUEST OFFICE NOTES FROM DR TORREZ WHOM HE SAW LAST WEEK SO THAT GIOVANNI WILL HAVE THOSE OFFICE NOTES AT HIS APPOINTMENT TODAY  # [phone]  FAX # [phone]

## 2024-10-21 NOTE — PROGRESS NOTES
PROBLEM LIST:    1.  History of Hodgkin lymphoma diagnosed in 1989-treated with systemic  Chemotherapy and radiation  2.  Chronic hypothyroidism.   3.  Gastroesophageal reflux disease.   4.  Chronic sleep disturbance.   5.  Extrinsic allergies  6.  Recurrent epididymitis  7.  History of craniotomy for a colloid cyst  8.  Pericardial effusion    You have chosen to receive care through a telehealth visit.  Do you consent to use a video/audio connection for your medical care today? Yes    Patient is at home in [place] and I am in our office in [place] for video visit      REASON FOR VISIT: Pericardial effusion    HISTORY OF PRESENT ILLNESS:   59 y.o.  male presents today for follow-up with video visit.  He has followed up with Dr. Meyer with repeat Echo.  Pericardial effusion has worsened to 2.5 cm, but pt remains relatively asymptomatic.  Also noted moderate AS.  Plans to follow up in 6 months with repeat Echo unless symptoms worse.  No current plans with Dr. Hopson for pericardial window unless symptoms worsen.  Pt is doing well.  Rare episode of SOA with showering.  However, was cutting up trees without issues.  Denies any significant SOA/Orthopnea/activity intolerance.  Denies any pain or lymphadenopathy.  He has had 30 lbs intentional weight loss with initiation Zepbound.      Past medical history, social history and family history was reviewed and unchanged from prior visit.    Review of Systems:    Review of Systems   Constitutional: Negative.    HENT:  Negative.     Eyes: Negative.    Respiratory:  Positive for shortness of breath.    Cardiovascular: Negative.    Gastrointestinal: Negative.    Endocrine: Negative.    Genitourinary: Negative.     Musculoskeletal: Negative.    Skin: Negative.    Neurological: Negative.    Hematological: Negative.    Psychiatric/Behavioral: Negative.        A comprehensive 14 point review of systems was performed and was negative except as mentioned.      Medications:         Current Outpatient Medications:     ALPRAZolam (XANAX) 1 MG tablet, Take 1 tablet by mouth Daily As Needed for Anxiety., Disp: , Rfl:     amLODIPine (NORVASC) 5 MG tablet, Take 0.5 tablets by mouth Daily. 2.5 mg 3 times weekly, Disp: , Rfl:     cetirizine (ZyrTEC) 10 MG tablet, Take 1 tablet by mouth Daily., Disp: , Rfl:     esomeprazole (NexIUM) 20 MG capsule, Take 1 capsule by mouth Daily., Disp: , Rfl:     levothyroxine (SYNTHROID, LEVOTHROID) 125 MCG tablet, TAKE 1 TABLET BY MOUTH ONCE DAILY, Disp: 30 tablet, Rfl: 11    metoprolol tartrate (LOPRESSOR) 25 MG tablet, Take 0.5 tablets by mouth. 12.5 mg bid, Disp: , Rfl:     Tirzepatide-Weight Management (Zepbound) 2.5 MG/0.5ML solution auto-injector, Inject 0.5 mL under the skin into the appropriate area as directed 1 (One) Time Per Week., Disp: , Rfl:     valsartan (DIOVAN) 160 MG tablet, TAKE 2 TABLETS PO QD, Disp: , Rfl:     vitamin B-12 (CYANOCOBALAMIN) 1000 MCG tablet, Take 1 tablet by mouth Daily., Disp: , Rfl:     zolpidem (AMBIEN) 10 MG tablet, Take 1 tablet by mouth At Night As Needed for Sleep., Disp: , Rfl:       ALLERGIES:    Allergies   Allergen Reactions    Codeine Sulfate Other (See Comments)     Childhood reaction, reported by mother. Patient does not remember reaction          Physical Exam    VITAL SIGNS:  Pulse 74   SpO2 95%     Wt Readings from Last 3 Encounters:   08/19/24 94.3 kg (208 lb)   06/03/24 101 kg (222 lb)   04/26/24 105 kg (230 lb 14.4 oz)       There is no height or weight on file to calculate BMI. There is no height or weight on file to calculate BSA.         Performance Status: 0    General: well appearing, in no acute distress  Lungs: unlabored  Psych: Mood is stable        RECENT LABS:    Lab Results   Component Value Date    HGB 13.4 09/08/2023    HCT 40.1 09/08/2023    MCV 91.8 09/08/2023     09/08/2023    WBC 8.57 09/08/2023    NEUTROABS 6.49 09/08/2023    LYMPHSABS 1.06 09/08/2023    MONOSABS 0.73 09/08/2023     EOSABS 0.16 09/08/2023    BASOSABS 0.08 09/08/2023       Lab Results   Component Value Date    GLUCOSE 98 09/08/2023    BUN 14 09/08/2023    CREATININE 1.06 09/08/2023     (L) 09/08/2023    K 4.3 09/08/2023    CL 98 09/08/2023    CO2 24.0 09/08/2023    CALCIUM 9.7 09/08/2023    PROTEINTOT 8.1 09/08/2023    ALBUMIN 4.8 09/08/2023    BILITOT 0.3 09/08/2023    ALKPHOS 63 09/08/2023    AST 22 09/08/2023    ALT 29 09/08/2023     Lab Results   Component Value Date    TSH 3.240 04/26/2024   Free T4 1.27        Assessment/Plan    1.  Pericardial effusion possibly related to history of Hodgkin's.  Continue to monitor for late effects of radiation.  Not current surgical candidate with Dr. Hopson  due to stable effusion/asymptomatic.  Some progression of pericardial effusion to 2.5 cm on recent echo with Dr. Meyer with moderate AS/NL EF.  Relatively asymptomatic.  Plans for repeat Echo in 6 with Dr. Meyer.  Discussed with pt to contact Dr. Meyer's office with any worsening symptoms.    2.  History of Hodgkin's lymphoma with acute history of chemotherapy and radiation.  Late effects of radiation is probably the more concerning issue.  There is a risk of a secondary malignancies such as lung cancer.  I.    3.  Recurrent epididymitis.  He has been on prolonged antibiotics in the past.  Follows up with urology.    Follow up in 6 months with video visit after seeing Dr. Meyer.  If Echo remains stable, will plan to return to annual follow up with labs.    I spent 31 minutes caring for Angel.  This includes time spent by me in the following activities:  preparing for visit, reviewing tests, obtaining history, performing physical exam, education, ordering necessary medications/testing and documenting in the medical record.    ANYA Wilson  [facility]         No orders of the defined types were placed in this encounter.      10/21/2024

## 2025-01-30 ENCOUNTER — TRANSCRIBE ORDERS (OUTPATIENT)
Dept: GENERAL RADIOLOGY | Facility: HOSPITAL | Age: 60
End: 2025-01-30
Payer: COMMERCIAL

## 2025-01-30 ENCOUNTER — HOSPITAL ENCOUNTER (OUTPATIENT)
Dept: GENERAL RADIOLOGY | Facility: HOSPITAL | Age: 60
Discharge: HOME | End: 2025-01-30
Admitting: INTERNAL MEDICINE
Payer: COMMERCIAL

## 2025-01-30 DIAGNOSIS — R06.00 DYSPNEA, UNSPECIFIED TYPE: ICD-10-CM

## 2025-01-30 DIAGNOSIS — R06.00 DYSPNEA, UNSPECIFIED TYPE: Primary | ICD-10-CM

## 2025-01-30 PROCEDURE — 71046 X-RAY EXAM CHEST 2 VIEWS: CPT

## 2025-03-05 ENCOUNTER — OFFICE VISIT (OUTPATIENT)
Dept: PULMONOLOGY | Facility: CLINIC | Age: 60
End: 2025-03-05
Payer: COMMERCIAL

## 2025-03-05 VITALS
BODY MASS INDEX: 30.07 KG/M2 | SYSTOLIC BLOOD PRESSURE: 126 MMHG | TEMPERATURE: 97.6 F | HEIGHT: 69 IN | DIASTOLIC BLOOD PRESSURE: 78 MMHG | OXYGEN SATURATION: 92 % | HEART RATE: 88 BPM | WEIGHT: 203 LBS

## 2025-03-05 DIAGNOSIS — R09.02 EXERCISE HYPOXEMIA: ICD-10-CM

## 2025-03-05 DIAGNOSIS — R06.02 SHORTNESS OF BREATH: Primary | ICD-10-CM

## 2025-03-05 DIAGNOSIS — G47.33 OSA ON CPAP: ICD-10-CM

## 2025-03-05 DIAGNOSIS — I31.39 PERICARDIAL EFFUSION: ICD-10-CM

## 2025-03-05 RX ORDER — ALBUTEROL SULFATE 90 UG/1
4 INHALANT RESPIRATORY (INHALATION) ONCE
Status: COMPLETED | OUTPATIENT
Start: 2025-03-05 | End: 2025-03-05

## 2025-03-05 RX ADMIN — ALBUTEROL SULFATE 4 PUFF: 90 INHALANT RESPIRATORY (INHALATION) at 11:17

## 2025-03-05 NOTE — PROGRESS NOTES
New Pulmonary Patient Office Visit      Patient Name: Angel Campbell    Referring Physician: Jordy Meyer MD    Chief Complaint:    Chief Complaint   Patient presents with    Shortness of Breath       History of Present Illness: Angel Campbell is a 59 y.o. male who is here today to establish care with Pulmonary for shortness of breath, hypoxemia, sleep apnea.    Pleasant 59-year-old male with previous history of Hodgkin lymphoma diagnosed in 1989-treated with systemic chemotherapy and radiation, hypothyroidism, GERD, recurrent epididymitis, history of craniotomy for colloid cyst in the past.  Patient is referred here for evaluation of shortness of breath.  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  He has been noticing his pulse ox at home and it is going down to 77% after taking shower or even after doing minimal  exertion activity.  States that he cannot work out anymore because he feels very short of breath.  He denies any history of asthma growing up.  Lifelong non-smoker and no secondhand smoke exposure.  Denies any chest pain.  Denies any chronic cough, sputum production.  Denies any fever, chills or night sweats.  States that his appetite has been low and has lost about 40 pounds of weight in last year but he has been attempting to lose weight and that weight loss has not helped his shortness of breath either.  Denies any frequent pneumonias or infections or hospitalization.    During the workup patient was found to have large pericardial effusion for which patient was referred to CT surgery but they did not think patient would benefit from pericardial window.  He is referred here for further evaluation for any pulmonary etiology of his shortness of breath.    Patient in the meantime was also having sleep issues with snoring and stopping breathing episodes.  He bought CPAP device online and started using it  and he has been tolerating that well.  He feels that his sleep quality is improved.  I was able to review his download on the CPAP which shows treated sleep apnea with current auto CPAP settings although he is reaching the maximum limit on the auto CPAP but he does not think he can tolerate pressure higher than 15 cm water so we will leave him there as his AHI is controlled.  His overnight oximetry on auto CPAP is also acceptable.    Patient does drink alcohol 2-3 drinks a night.  Denies any other illicit drug use.        Subjective      Review of Systems:   Review of Systems   Constitutional: Negative.  Positive for activity change and fatigue.   HENT: Negative.     Respiratory: Negative.  Positive for shortness of breath.    Cardiovascular: Negative.    Gastrointestinal: Negative.  Positive for GERD.   Endocrine: Negative.    Musculoskeletal: Negative.    Skin: Negative.    Neurological: Negative.  Positive for dizziness and light-headedness.   Hematological: Negative.    Psychiatric/Behavioral: Negative.  Positive for agitation, sleep disturbance and depressed mood. The patient is nervous/anxious.    All other systems reviewed and are negative.      Past Medical History:   Past Medical History:   Diagnosis Date    Allergic rhinitis     GERD (gastroesophageal reflux disease)     Hodgkin lymphoma     Hodgkin's disease     Hypertension     Hypothyroidism     Sleep apnea, obstructive        Past Surgical History:   Past Surgical History:   Procedure Laterality Date    SKIN CANCER EXCISION  07/2019    Back- Basal Cell    SKIN CANCER EXCISION  2022    Back of neck       Family History:   Family History   Problem Relation Age of Onset    Breast cancer Mother     Cancer Mother         Breast Cancer    Colon cancer Father     Cancer Father         Stage IV Colorectal Cancer       Social History:   Social History     Socioeconomic History    Marital status:    Tobacco Use    Smoking status: Never    Smokeless  "tobacco: Never   Vaping Use    Vaping status: Never Used   Substance and Sexual Activity    Alcohol use: Yes     Alcohol/week: 2.0 - 3.0 standard drinks of alcohol     Types: 2 - 3 Drinks containing 0.5 oz of alcohol per week     Comment: 2-3 drinks daily    Drug use: No    Sexual activity: Yes     Partners: Female     Birth control/protection: Partner of same sex       Medications:     Current Outpatient Medications:     ALPRAZolam (XANAX) 1 MG tablet, Take 1 tablet by mouth Daily As Needed for Anxiety., Disp: , Rfl:     esomeprazole (NexIUM) 20 MG capsule, Take 1 capsule by mouth Daily., Disp: , Rfl:     levothyroxine (SYNTHROID, LEVOTHROID) 125 MCG tablet, TAKE 1 TABLET BY MOUTH ONCE DAILY, Disp: 30 tablet, Rfl: 11    Tirzepatide-Weight Management (Zepbound) 2.5 MG/0.5ML solution auto-injector, Inject 0.5 mL under the skin into the appropriate area as directed 1 (One) Time Per Week., Disp: , Rfl:     valsartan (DIOVAN) 160 MG tablet, TAKE 2 TABLETS PO QD, Disp: , Rfl:     vitamin B-12 (CYANOCOBALAMIN) 1000 MCG tablet, Take 1 tablet by mouth Daily., Disp: , Rfl:     zolpidem (AMBIEN) 10 MG tablet, Take 1 tablet by mouth At Night As Needed for Sleep., Disp: , Rfl:     cetirizine (ZyrTEC) 10 MG tablet, Take 1 tablet by mouth Daily. (Patient not taking: Reported on 3/5/2025), Disp: , Rfl:   No current facility-administered medications for this visit.    Allergies:   Allergies   Allergen Reactions    Codeine Sulfate Other (See Comments)     Childhood reaction, reported by mother. Patient does not remember reaction        Objective     Physical Exam:  Vital Signs:   Vitals:    03/05/25 0947   BP: 126/78   Pulse: 88   Temp: 97.6 °F (36.4 °C)   TempSrc: Infrared   SpO2: 92%  Comment: room air at rest   Weight: 92.1 kg (203 lb)   Height: 175.3 cm (69.02\")     Body mass index is 29.96 kg/m².    Physical Exam  Vitals and nursing note reviewed.   Constitutional:       General: He is not in acute distress.     Appearance: He " is well-developed. He is not diaphoretic.   HENT:      Head: Normocephalic and atraumatic.      Nose: Nose normal.   Eyes:      General:         Right eye: No discharge.         Left eye: No discharge.      Pupils: Pupils are equal, round, and reactive to light.   Neck:      Thyroid: No thyromegaly.      Trachea: No tracheal deviation.   Cardiovascular:      Rate and Rhythm: Normal rate and regular rhythm.      Heart sounds: Murmur (2/6 JOSE aortic area) heard.      Friction rub present. No gallop.   Pulmonary:      Effort: Pulmonary effort is normal. No respiratory distress.      Breath sounds: Normal breath sounds. No wheezing or rales.   Musculoskeletal:         General: No tenderness.      Cervical back: Neck supple.      Right lower leg: No edema.      Left lower leg: No edema.      Comments: Clubbing: none   Neurological:      Mental Status: He is alert and oriented to person, place, and time.   Psychiatric:         Behavior: Behavior normal.         Thought Content: Thought content normal.         Judgment: Judgment normal.         Results Review:   I reviewed the patient's new clinical results.    PFT done today reviewed personally and showed normal spirometry.  No significant bronchodilator response.  Normal total lung capacity.  Normal DLCO.  No air trapping noted.    Chest x-ray reviewed and showed enlarged cardiac silhouette without any acute pulmonary pathology appreciated.    CPAP download data reviewed.  Patient has been compliant with CPAP therapy.  Average usage is 4 hours and 36 minutes.  On auto CPAP 4 to 15 cm water pressure.  AHI 0.7.  95th percentile pressure of 10.4.  No significant leak noted.  More than 4-hour compliance is 50%.    Overnight oximetry on auto CPAP reviewed and showed well-controlled hypoxemia at night.  Only 1 minutes 36 seconds assessment at or below 88% oxygen saturation.  Oxygen desaturation index of 1.09.    Previous echocardiogram reviewed and showed moderate aortic  sclerosis with calcified aortic valve.  Large pericardial effusion.    6-minute walk test done today showed patient dropped oxygen saturation to 86% with walking and needed 3 L/min oxygen to get saturation above 90%.      Assessment / Plan      Assessment:   Problem List Items Addressed This Visit          Cardiac and Vasculature    Pericardial effusion     Other Visit Diagnoses       Shortness of breath    -  Primary    Relevant Medications    albuterol sulfate HFA (PROVENTIL HFA;VENTOLIN HFA;PROAIR HFA) inhaler 4 puff (Completed)    Other Relevant Orders    XR Chest PA & Lateral    Spirometry with Diffusion Capacity & Lung Volumes    6 Minute Walk Test (Completed)    Exercise hypoxemia        CHANG on CPAP                Plan:   1.  Patient presenting with complains of worsening shortness of breath which is progressive.  I did review all his pulmonary testing including PFTs which are completely normal study.  His chest x-ray does not show any new findings compared to before.  No evidence of pulmonary fibrosis or scarring in the lung.  There is 1 area where seems like there could be extrinsic compression on the trachea but looking back at her x-rays even from 2021 I do not think there is any dramatic change.  He does not have any clinical symptomatology of bronchospastic disease.  Denies any ongoing respiratory symptoms other than shortness of breath.  No history of thromboembolic disease or blood clots in the past.  I spent time talking to him about all these issues and discussed that so far I am not seeing anything which is worrying me in his lungs.  If we need to continue further workup I would recommend CT angiogram to evaluate his lung parenchyma and also pulmonary vasculature to further evaluate.  He has had previous radiation therapy but on the chest x-ray I am not seeing any significant pulmonary scarring but that will also be evaluated.  Patient wants to hold off on that for now which is reasonable since my  suspicion for any significant parenchymal disease is low at this time.    2.   given patient's self history of hypoxemia with minimal exertional activity we performed a 6-minute walk test today and based on that testing patient is hypoxic and would benefit from supplemental oxygen.  After some reluctance patient is agreeable to proceed with oxygen set up.  We will see if we can set him up with portable concentrator so that he can be mobile.  Advised to keep that as a safety net at home in case his oxygen drops any further.  He is agreeable with our plan for now.    3.  He tells me that he has had recent workup done and his hemoglobin was 14 so less likely anemia playing a role in his shortness of breath and significant dyspnea.  No other untoward findings noted on exam.  He is already being treated for thyroid disease.  He has asked his primary care to send blood work to our office and once that is available I will review it to make sure we are not missing any other pathology.    4.  His sleep apnea is treated well.  His AHI is controlled on current auto CPAP settings.  His overnight oximetry did not show any evidence of nocturnal hypoxemia.  This is all reassuring and he was advised to continue using his CPAP device for now.    5.  This leaves the question of why patient is so short of breath and hypoxemic.  I am wondering if pericardial effusion is playing a bigger role than what we think.  We will have cardiology team reevaluate him to see if he would benefit from diagnostic and therapeutic tap of the pleural fluid to see the etiology of this effusion.  Once we are able to achieve that then we can see if patient  shows an improvement in his shortness of breath symptoms.  If it reoccurs then may need pericardial window and pericardial biopsy.  Patient also has aortic sclerosis.  That may need to also be looked at to make sure it is not more hemodynamically significant which could be causing his symptoms.  I will  defer further workup to cardiology team.  If cardiology team does not think that pericardial effusion or his aortic valve is playing a role in his symptoms then I will proceed with further imaging such as CT angiogram to complete pulmonary workup.      All this discussed in detail with the patient and his wife.  Questions answered.  I will be available if any further questions on this patient.  Thank you for allowing me to participate in the care of this patient.  All the test results reviewed with patient in person and appreciated's questions given.    Follow Up:   3 months    Discussed plan of care in detail with patient today. Patient verbally understands and agrees. I spent 62 minutes on this date of service. This time includes time spent by me in the following activities:preparing for the visit, reviewing tests, obtaining and/or reviewing a separately obtained history, performing a medically appropriate examination, counseling the patient/family, ordering tests, or procedures, and/or documenting information in the medical record. This time excludes other separate billable services such as interpretation of tests or procedures, if applicable.        Carlos Manuel Wood MD  Pulmonary Critical Care and Sleep Medicine

## 2025-03-17 DIAGNOSIS — I31.39 PERICARDIAL EFFUSION: Primary | ICD-10-CM

## 2025-03-26 ENCOUNTER — LAB (OUTPATIENT)
Facility: HOSPITAL | Age: 60
End: 2025-03-26
Payer: COMMERCIAL

## 2025-03-26 ENCOUNTER — OFFICE VISIT (OUTPATIENT)
Dept: CARDIOLOGY | Facility: CLINIC | Age: 60
End: 2025-03-26
Payer: COMMERCIAL

## 2025-03-26 ENCOUNTER — HOSPITAL ENCOUNTER (OUTPATIENT)
Facility: HOSPITAL | Age: 60
Discharge: HOME | End: 2025-03-26
Payer: COMMERCIAL

## 2025-03-26 VITALS
DIASTOLIC BLOOD PRESSURE: 80 MMHG | HEIGHT: 69 IN | HEART RATE: 83 BPM | WEIGHT: 195.6 LBS | OXYGEN SATURATION: 97 % | SYSTOLIC BLOOD PRESSURE: 112 MMHG | BODY MASS INDEX: 28.97 KG/M2

## 2025-03-26 VITALS
HEIGHT: 69 IN | SYSTOLIC BLOOD PRESSURE: 91 MMHG | WEIGHT: 195 LBS | BODY MASS INDEX: 28.88 KG/M2 | DIASTOLIC BLOOD PRESSURE: 58 MMHG

## 2025-03-26 DIAGNOSIS — I31.39 PERICARDIAL EFFUSION: Primary | Chronic | ICD-10-CM

## 2025-03-26 DIAGNOSIS — I30.0 ACUTE IDIOPATHIC PERICARDITIS: Chronic | ICD-10-CM

## 2025-03-26 DIAGNOSIS — I31.39 PERICARDIAL EFFUSION: Chronic | ICD-10-CM

## 2025-03-26 DIAGNOSIS — I35.0 NONRHEUMATIC AORTIC VALVE STENOSIS: Chronic | ICD-10-CM

## 2025-03-26 LAB
AORTIC DIMENSIONLESS INDEX: 0.19 (DI)
AV MEAN PRESS GRAD SYS DOP V1V2: 33 MMHG
AV VMAX SYS DOP: 356 CM/SEC
BH CV ECHO MEAS - AO MAX PG: 51 MMHG
BH CV ECHO MEAS - AO ROOT DIAM: 2.3 CM
BH CV ECHO MEAS - AO V2 VTI: 86 CM
BH CV ECHO MEAS - AVA(I,D): 0.6 CM2
BH CV ECHO MEAS - EDV(CUBED): 68.9 ML
BH CV ECHO MEAS - EDV(MOD-SP2): 95.9 ML
BH CV ECHO MEAS - EDV(MOD-SP4): 105 ML
BH CV ECHO MEAS - EF(MOD-SP2): 70.2 %
BH CV ECHO MEAS - EF(MOD-SP4): 57.9 %
BH CV ECHO MEAS - ESV(CUBED): 27 ML
BH CV ECHO MEAS - ESV(MOD-SP2): 28.6 ML
BH CV ECHO MEAS - ESV(MOD-SP4): 44.2 ML
BH CV ECHO MEAS - FS: 26.8 %
BH CV ECHO MEAS - IVS/LVPW: 1 CM
BH CV ECHO MEAS - IVSD: 1.2 CM
BH CV ECHO MEAS - LA DIMENSION: 3.6 CM
BH CV ECHO MEAS - LAT PEAK E' VEL: 8.4 CM/SEC
BH CV ECHO MEAS - LV MASS(C)D: 171.7 GRAMS
BH CV ECHO MEAS - LV MAX PG: 2.37 MMHG
BH CV ECHO MEAS - LV MEAN PG: 1 MMHG
BH CV ECHO MEAS - LV V1 MAX: 77 CM/SEC
BH CV ECHO MEAS - LV V1 VTI: 15.1 CM
BH CV ECHO MEAS - LVIDD: 4.1 CM
BH CV ECHO MEAS - LVIDS: 3 CM
BH CV ECHO MEAS - LVOT AREA: 3.1 CM2
BH CV ECHO MEAS - LVOT DIAM: 2 CM
BH CV ECHO MEAS - LVPWD: 1.2 CM
BH CV ECHO MEAS - MED PEAK E' VEL: 7.6 CM/SEC
BH CV ECHO MEAS - MV A MAX VEL: 79.7 CM/SEC
BH CV ECHO MEAS - MV DEC TIME: 0.2 SEC
BH CV ECHO MEAS - MV E MAX VEL: 78.1 CM/SEC
BH CV ECHO MEAS - MV E/A: 0.98
BH CV ECHO MEAS - MV MAX PG: 3.9 MMHG
BH CV ECHO MEAS - MV MEAN PG: 2 MMHG
BH CV ECHO MEAS - MV V2 VTI: 31.4 CM
BH CV ECHO MEAS - MVA(VTI): 1.51 CM2
BH CV ECHO MEAS - PA ACC TIME: 0.19 SEC
BH CV ECHO MEAS - PA V2 MAX: 98.7 CM/SEC
BH CV ECHO MEAS - RAP SYSTOLE: 3 MMHG
BH CV ECHO MEAS - RVSP: 17 MMHG
BH CV ECHO MEAS - SV(LVOT): 47.4 ML
BH CV ECHO MEAS - SV(MOD-SP2): 67.3 ML
BH CV ECHO MEAS - SV(MOD-SP4): 60.8 ML
BH CV ECHO MEAS - TR MAX PG: 13.8 MMHG
BH CV ECHO MEAS - TR MAX VEL: 185 CM/SEC
BH CV ECHO MEASUREMENTS AVERAGE E/E' RATIO: 9.76
BH CV VAS BP RIGHT ARM: NORMAL MMHG
BH CV XLRA - RV BASE: 3 CM
BH CV XLRA - RV LENGTH: 7.6 CM
BH CV XLRA - RV MID: 2.6 CM
BH CV XLRA - TDI S': 14.7 CM/SEC
DEPRECATED RDW RBC AUTO: 44.1 FL (ref 37–54)
ERYTHROCYTE [DISTWIDTH] IN BLOOD BY AUTOMATED COUNT: 13.1 % (ref 12.3–15.4)
ERYTHROCYTE [SEDIMENTATION RATE] IN BLOOD: 13 MM/HR (ref 0–20)
HCT VFR BLD AUTO: 44 % (ref 37.5–51)
HGB BLD-MCNC: 14.7 G/DL (ref 13–17.7)
IVRT: 82 MS
LV EF BIPLANE MOD: 64 %
MCH RBC QN AUTO: 31.1 PG (ref 26.6–33)
MCHC RBC AUTO-ENTMCNC: 33.4 G/DL (ref 31.5–35.7)
MCV RBC AUTO: 93 FL (ref 79–97)
PLATELET # BLD AUTO: 325 10*3/MM3 (ref 140–450)
PMV BLD AUTO: 9.2 FL (ref 6–12)
RBC # BLD AUTO: 4.73 10*6/MM3 (ref 4.14–5.8)
WBC NRBC COR # BLD AUTO: 10.26 10*3/MM3 (ref 3.4–10.8)

## 2025-03-26 PROCEDURE — 25010000002 SULFUR HEXAFLUORIDE MICROSPH 60.7-25 MG RECONSTITUTED SUSPENSION: Performed by: INTERNAL MEDICINE

## 2025-03-26 PROCEDURE — 93306 TTE W/DOPPLER COMPLETE: CPT | Performed by: INTERNAL MEDICINE

## 2025-03-26 PROCEDURE — 86141 C-REACTIVE PROTEIN HS: CPT

## 2025-03-26 PROCEDURE — 83880 ASSAY OF NATRIURETIC PEPTIDE: CPT

## 2025-03-26 PROCEDURE — 36415 COLL VENOUS BLD VENIPUNCTURE: CPT

## 2025-03-26 PROCEDURE — 80050 GENERAL HEALTH PANEL: CPT

## 2025-03-26 PROCEDURE — 85652 RBC SED RATE AUTOMATED: CPT

## 2025-03-26 PROCEDURE — 93306 TTE W/DOPPLER COMPLETE: CPT

## 2025-03-26 RX ORDER — COLCHICINE 0.6 MG/1
0.6 TABLET ORAL 2 TIMES DAILY
Qty: 30 TABLET | Refills: 3 | Status: SHIPPED | OUTPATIENT
Start: 2025-03-26

## 2025-03-26 RX ADMIN — SULFUR HEXAFLUORIDE 2 ML: KIT at 14:18

## 2025-03-26 NOTE — ASSESSMENT & PLAN NOTE
Pericarditis over 10 years ago  Current symptoms are consistent with pericarditis with mild diffuse ST elevations confirm his diagnosis  Will start colchicine 1.2 mg loading dose today followed by 0.6 twice daily for 3 months after  Can use ibuprofen as needed for pain  Order ESR and CRP along with other metabolic labs

## 2025-03-26 NOTE — ASSESSMENT & PLAN NOTE
Per report, 1.7 cm pericardial effusion first detected incidentally in March 2024.  Has apparently grown to 2.4 cm on Dr. Meyer's echo report from January 2025.  Clinically, patient does not exhibit signs of cardiac tamponade.  He is hemodynamically stable and in no acute distress.  No neck vein distention but does have some hepatojugular reflux and heart sounds are muffled.  No clinical signs of heart failure or volume overload.  Suspect pericardial effusion is from recurrent pericarditis of unknown etiology  Obtaining stat echocardiogram  Will discuss with interventional cardiology for pericardiocentesis ASAP.  I do think we should arrange for pericardiocentesis in an urgent manner within the next few days..  Will need to send off for cultures and cytology to rule out malignancy in the setting of previous non-Hodgkin's lymphoma  Patient advised to go to the ER if his symptoms acutely worsen.  Will try to obtain to be echo images from Dr. Meyer's office for comparison    Orders:    High Sensitivity CRP; Future    Sedimentation Rate; Future    colchicine 0.6 MG tablet; Take 1 tablet by mouth 2 (Two) Times a Day.    BNP; Future    TSH Rfx On Abnormal To Free T4; Future    CBC (No Diff); Future    Comprehensive Metabolic Panel; Future    Adult Transthoracic Echo Complete W/ Cont if Necessary Per Protocol; Future    ECG 12 Lead

## 2025-03-26 NOTE — ASSESSMENT & PLAN NOTE
Mild to moderate on recent echocardiograms.  Murmur consistent with AS on exam today  Did have radiation to his neck for his non-Hodgkin's lymphoma  Repeat echo pending

## 2025-03-26 NOTE — PROGRESS NOTES
[facility]     Outpatient New Patient / Consult Note    Angel Campbell  [id number]  2025    Referred By: Carlos Manuel Wood MD    Chief Complaint   Patient presents with    Pericardial Effusion    Establish Care       Subjective     History of Present Illness:   Angel Campbell is a 59 y.o. male with history of idiopathic pericarditis and pericardial effusion found incidentally in 2024.  Patient had a routine echo done last year and was found to have a pericardial effusion measuring 1.7 cm.  He was apparently referred for an urgent pericardial window with no consideration of pericardiocentesis by cardiology in [place] but declined.  He was seen by Dr. Hopson in 2024 and due to his lack of symptoms at the time, decision was made to leave the effusion alone.  Over the past few weeks to months, patient has become more dyspneic with exertion.  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  Has had trouble just carrying his laptop around and has to drag it on a cart along with oxygen that was prescribed by his pulmonologist recently.  He cannot sleep flat and has to sleep in different positions in different locations throughout his house.  Yesterday, he had a lot of chest pain just like he had with pericarditis previously.  Says he has no quality of life and is severely limited in his daily activities.  Patient denies any leg swelling.  He had been on Lasix in the past, presumably during his previous pericarditis flare and it made his blood pressure dropped really low.  Of note, he has history of non-Hodgkin's lymphoma in , in remission, status post chemo and radiation.  He has been under surveillance by his oncologist with yearly chest x-rays.  There has been no recurrence that he knows of.    Past Cardiac History and Testin. Pericardial effusion  -- h/o pericarditis in , idiopathic?  --TTE 3/24 @[place]: EF 55-60%, mild AS,  moderate to large free-flowing pericardial effusion, circumferential.  No evidence of hemodynamic compromise.  -- TTE 1/25: EF 80%, LVH, moderate AS, large pericardial effusion measuring 2.4 cm  -- Not a pericardial window candidate per CTS   2.  Aortic stenosis  -- Mild in March 2024, read as moderate by Dr. Meyer in January 202 with a peak velocity of 3.6 m/s  3. H/o non-Hodgkins lymphoma in 1990, s/p chemo+radiation      Review of Systems:  Review of Systems   Constitutional:  Positive for fatigue. Negative for chills, unexpected weight gain and unexpected weight loss.   Respiratory:  Positive for chest tightness and shortness of breath. Negative for cough.    Cardiovascular:  Positive for chest pain. Negative for palpitations and leg swelling.   Gastrointestinal: Negative.    Musculoskeletal: Negative.    Neurological: Negative.        Past Medical History:   Diagnosis Date    Allergic rhinitis     Anxiety 30+ years    Asthma March 2024    SOA from pericardial effusion    GERD (gastroesophageal reflux disease)     Heart murmur 30+ years    Heart valve disease 2019    Hiatal hernia 1990    Was not mentioned on previous EGD    Hodgkin lymphoma     Hodgkin's disease     Hyperlipidemia 2022    Hypertension     Hypothyroidism     Sleep apnea, obstructive        Past Surgical History:   Procedure Laterality Date    SKIN CANCER EXCISION  07/2019    Back- Basal Cell    SKIN CANCER EXCISION  2022    Back of neck       Family History   Problem Relation Age of Onset    Breast cancer Mother     Cancer Mother         Breast Cancer--2020    Colon cancer Father     Cancer Father         Colorectal cancer--2023       Social History     Socioeconomic History    Marital status:    Tobacco Use    Smoking status: Never    Smokeless tobacco: Never   Vaping Use    Vaping status: Never Used   Substance and Sexual Activity    Alcohol use: Yes     Alcohol/week: 2.0 - 3.0 standard drinks of alcohol     Types: 2 - 3 Drinks  "containing 0.5 oz of alcohol per week     Comment: 2-3 drinks daily    Drug use: No    Sexual activity: Yes     Partners: Female     Birth control/protection: Partner of same sex         Current Outpatient Medications:     ALPRAZolam (XANAX) 1 MG tablet, Take 1 tablet by mouth Daily As Needed for Anxiety., Disp: , Rfl:     cetirizine (ZyrTEC) 10 MG tablet, Take 1 tablet by mouth As Needed., Disp: , Rfl:     esomeprazole (NexIUM) 20 MG capsule, Take 1 capsule by mouth Daily., Disp: , Rfl:     levothyroxine (SYNTHROID, LEVOTHROID) 125 MCG tablet, TAKE 1 TABLET BY MOUTH ONCE DAILY, Disp: 30 tablet, Rfl: 11    Tirzepatide-Weight Management (Zepbound) 2.5 MG/0.5ML solution auto-injector, Inject 0.5 mL under the skin into the appropriate area as directed 1 (One) Time Per Week., Disp: , Rfl:     valsartan (DIOVAN) 160 MG tablet, TAKE 2 TABLETS PO QD, Disp: , Rfl:     vitamin B-12 (CYANOCOBALAMIN) 1000 MCG tablet, Take 1 tablet by mouth Daily., Disp: , Rfl:     zolpidem (AMBIEN) 10 MG tablet, Take 1 tablet by mouth At Night As Needed for Sleep., Disp: , Rfl:     colchicine 0.6 MG tablet, Take 1 tablet by mouth 2 (Two) Times a Day., Disp: 30 tablet, Rfl: 3  No current facility-administered medications for this visit.    Allergies   Allergen Reactions    Codeine Sulfate Other (See Comments)     Childhood reaction, reported by mother. Patient does not remember reaction           Objective     Vitals:  Vitals:    03/26/25 0948   BP: 112/80   BP Location: Left arm   Patient Position: Sitting   Cuff Size: Adult   Pulse: 83   SpO2: 97%   Weight: 88.7 kg (195 lb 9.6 oz)   Height: 175.3 cm (69.02\")       Physical Exam:  Vitals reviewed.   Constitutional:       Appearance: Healthy appearance. Not in distress.   Neck:      Vascular: Hepatojugular reflux present. No JVD.   Pulmonary:      Effort: Pulmonary effort is normal.      Breath sounds: Normal breath sounds.   Cardiovascular:      Normal rate. Regular rhythm. Normal S1. Normal " S2.       Murmurs: There is a grade 2/6 harsh midsystolic murmur at the URSB, radiating to the neck.  Heart sounds are muffled      No gallop.  No click. No rub.   Pulses:     Intact distal pulses.   Edema:     Peripheral edema absent.   Abdominal:      General: There is no distension.      Palpations: Abdomen is soft.      Tenderness: There is no abdominal tenderness.   Skin:     General: Skin is warm and dry.         Results Review:   I reviewed the patient's new clinical results.  I reviewed the patient's new imaging results and agree with the interpretation.  I reviewed the patient's other test results and agree with the interpretation  I personally viewed and interpreted the patient's EKG/Telemetry data      ECG 12 Lead    Date/Time: 3/26/2025 10:58 AM  Performed by: Darrell Márquez MD    Authorized by: Darrell Márquez MD  Comparison: not compared with previous ECG   Previous ECG: no previous ECG available  Rhythm: sinus rhythm  Rate: normal  BPM: 83  Conduction: conduction normal  ST Elevation: all (Mild, diffuse ST elevation)  QRS axis: normal    Clinical impression: abnormal EKG          Most Recent Labs:  T4, free (04/26/2024 15:20)  TSH (04/26/2024 15:20)  B-type natriuretic peptide (04/26/2024 15:20)  C-reactive Protein (04/26/2024 15:20)  Sedimentation Rate (04/26/2024 15:20)  Troponin (04/26/2024 15:20)  Comprehensive metabolic panel (03/19/2024 03:20)  CBC auto differential (03/19/2024 03:20)           Assessment & Plan  Pericardial effusion  Per report, 1.7 cm pericardial effusion first detected incidentally in March 2024.  Has apparently grown to 2.4 cm on Dr. Meyer's echo report from January 2025.  Clinically, patient does not exhibit signs of cardiac tamponade.  He is hemodynamically stable and in no acute distress.  No neck vein distention but does have some hepatojugular reflux and heart sounds are muffled.  No clinical signs of heart failure or volume overload.  Suspect pericardial effusion is  from recurrent pericarditis of unknown etiology  Obtaining stat echocardiogram  Will discuss with interventional cardiology for pericardiocentesis ASAP.  I do think we should arrange for pericardiocentesis in an urgent manner within the next few days..  Will need to send off for cultures and cytology to rule out malignancy in the setting of previous non-Hodgkin's lymphoma  Patient advised to go to the ER if his symptoms acutely worsen.  Will try to obtain to be echo images from Dr. Meyer's office for comparison    Orders:    High Sensitivity CRP; Future    Sedimentation Rate; Future    colchicine 0.6 MG tablet; Take 1 tablet by mouth 2 (Two) Times a Day.    BNP; Future    TSH Rfx On Abnormal To Free T4; Future    CBC (No Diff); Future    Comprehensive Metabolic Panel; Future    Adult Transthoracic Echo Complete W/ Cont if Necessary Per Protocol; Future    ECG 12 Lead    Acute idiopathic pericarditis  Pericarditis over 10 years ago  Current symptoms are consistent with pericarditis with mild diffuse ST elevations confirm his diagnosis  Will start colchicine 1.2 mg loading dose today followed by 0.6 twice daily for 3 months after  Can use ibuprofen as needed for pain  Order ESR and CRP along with other metabolic labs       Nonrheumatic aortic valve stenosis  Mild to moderate on recent echocardiograms.  Murmur consistent with AS on exam today  Did have radiation to his neck for his non-Hodgkin's lymphoma  Repeat echo pending              Plan   Preventative Cardiology:   Tobacco Cessation: N/A  Obstructive Sleep Apnea Screening: Deffered  AAA Screening: Deffered  Peripheral Arterial Disease Screening: Deffered           Follow Up:   Return in about 6 weeks (around 5/7/2025).    I spent 62 minutes evaluating, treating, counseling, coordinating patient care, reviewing old/outside records, and documenting. This excludes time spent on separately billable services and procedures.       Thank you for allowing me to  participate in the care of your patient. Please to not hesitate to contact me with additional questions or concerns.     Electronically signed by Darrell Márquez MD, 03/26/25, 7:26 PM EDT.

## 2025-03-27 ENCOUNTER — TELEPHONE (OUTPATIENT)
Dept: CARDIOLOGY | Facility: CLINIC | Age: 60
End: 2025-03-27
Payer: COMMERCIAL

## 2025-03-27 ENCOUNTER — RESULTS FOLLOW-UP (OUTPATIENT)
Dept: CARDIOLOGY | Facility: CLINIC | Age: 60
End: 2025-03-27
Payer: COMMERCIAL

## 2025-03-27 DIAGNOSIS — I31.39 PERICARDIAL EFFUSION: Primary | ICD-10-CM

## 2025-03-27 LAB
ALBUMIN SERPL-MCNC: 4.2 G/DL (ref 3.5–5.2)
ALBUMIN/GLOB SERPL: 1.4 G/DL
ALP SERPL-CCNC: 64 U/L (ref 39–117)
ALT SERPL W P-5'-P-CCNC: 20 U/L (ref 1–41)
ANION GAP SERPL CALCULATED.3IONS-SCNC: 13.2 MMOL/L (ref 5–15)
AST SERPL-CCNC: 17 U/L (ref 1–40)
BILIRUB SERPL-MCNC: 1.2 MG/DL (ref 0–1.2)
BUN SERPL-MCNC: 15 MG/DL (ref 6–20)
BUN/CREAT SERPL: 11.2 (ref 7–25)
CALCIUM SPEC-SCNC: 9.1 MG/DL (ref 8.6–10.5)
CHLORIDE SERPL-SCNC: 100 MMOL/L (ref 98–107)
CO2 SERPL-SCNC: 22.8 MMOL/L (ref 22–29)
CREAT SERPL-MCNC: 1.34 MG/DL (ref 0.76–1.27)
CRP SERPL-MCNC: 9.11 MG/DL (ref 0.01–0.5)
EGFRCR SERPLBLD CKD-EPI 2021: 61.8 ML/MIN/1.73
GLOBULIN UR ELPH-MCNC: 3.1 GM/DL
GLUCOSE SERPL-MCNC: 95 MG/DL (ref 65–99)
NT-PROBNP SERPL-MCNC: 139 PG/ML (ref 0–900)
POTASSIUM SERPL-SCNC: 4.1 MMOL/L (ref 3.5–5.2)
PROT SERPL-MCNC: 7.3 G/DL (ref 6–8.5)
SODIUM SERPL-SCNC: 136 MMOL/L (ref 136–145)
TSH SERPL DL<=0.05 MIU/L-ACNC: 0.31 UIU/ML (ref 0.27–4.2)

## 2025-03-28 ENCOUNTER — HOSPITAL ENCOUNTER (OUTPATIENT)
Dept: CARDIOLOGY | Facility: HOSPITAL | Age: 60
Discharge: HOME | End: 2025-03-28
Payer: COMMERCIAL

## 2025-03-28 DIAGNOSIS — Z00.6 ENCOUNTER FOR EXAMINATION FOR NORMAL COMPARISON OR CONTROL IN CLINICAL RESEARCH PROGRAM: ICD-10-CM

## 2025-04-01 PROCEDURE — S0260 H&P FOR SURGERY: HCPCS | Performed by: INTERNAL MEDICINE

## 2025-04-01 NOTE — H&P
[facility] at [facility] HISTORY AND PHYSICAL    Angel Campbell  : 1965  MRN:[id number]  IDENTIFICATION: A 59 y.o. male     Date of Admission:(Not on file)      CC: Pericardial Effusion, Aortic Stenosis       HPI: Patient is a 59-year-old male with past medical history listed below who was sent for referral from Dr. Márquez for pericardiocentesis.  Patient has been dealing with pericardial effusion since 2024 which was incidentally found during a routine echocardiogram at [facility].  Patient was referred for an urgent pericardial window with no consideration for pericardiocentesis but cardiology at [facility] declined.  Patient was then seen by Dr. Hopson in 2024, however due to lack of symptoms at the time the decision was to leave the usual.  Subsequently for the last several weeks to months the patient has become more dyspneic with exertion.  Patient is unable to sleep flat and is now experiencing chest pain to the point that he feels he has no quality of life.  Family limited in his daily activities.  Of note patient had non-Hodgkin's lymphoma in  which he has been in remission after chemotherapy and radiation.  Patient is under surveillance by his oncologist with yearly chest x-rays and there has been no recurrence of his cancer that he is aware of.      Problem List:   Pericardial Effusion   h/o pericarditis in , idiopathic?  TTE 3/24 @[place]: EF 55-60%, mild AS, moderate to large free-flowing pericardial effusion, circumferential.  No evidence of hemodynamic compromise.  TTE : EF 80%, LVH, moderate AS, large pericardial effusion measuring 2.4 cm  TTE 3/26/25: EF 64% large circumferential pericardial effusion with right atrial free wall collapse present. Maximal diameter of 2.3-2.5 cm along the anterolateral free wall of the left ventricle. There is no echocardiographic evidence of cardiac tamponade. IVC is of normal size and not plethoric.   Not a  pericardial window candidate per CTS   Nonrheumatic Aortic Stenosis   Mild in March 2024, read as moderate by Dr. Meyer in January 2024 with a peak velocity of 3.6 m/s   TTE 3/26/25: EF 64%, Moderate to severe aortic valve stenosis is present. (Vmax 3.6 m/s. mPG 33 mmHg. ELVA 0.6 cm2. DI 0.19.)   HTN  HLD  CHANG  Hypothyroidism  GERD  Non-Hodgkins Lymphoma 1990  S/P Chemo & Radiation     ROS:   @Cardiovascular ROS: positive for - dyspnea on exertion, orthopnea, and palpitations@    Home Medications:     Prior to Admission medications    Medication Sig Start Date End Date Taking? Authorizing Provider   ALPRAZolam (XANAX) 1 MG tablet Take 1 tablet by mouth Daily As Needed for Anxiety.    Seng Brar MD   cetirizine (ZyrTEC) 10 MG tablet Take 1 tablet by mouth As Needed.    Seng Brar MD   colchicine 0.6 MG tablet Take 1 tablet by mouth 2 (Two) Times a Day. 3/26/25   Darrell Márquez MD   esomeprazole (NexIUM) 20 MG capsule Take 1 capsule by mouth Daily.    Seng Brar MD   levothyroxine (SYNTHROID, LEVOTHROID) 125 MCG tablet TAKE 1 TABLET BY MOUTH ONCE DAILY 9/4/20   Rashi Rossi MD   Tirzepatide-Weight Management (Zepbound) 2.5 MG/0.5ML solution auto-injector Inject 0.5 mL under the skin into the appropriate area as directed 1 (One) Time Per Week.    Seng Brar MD   valsartan (DIOVAN) 160 MG tablet TAKE 2 TABLETS PO QD 6/24/20   Seng Brar MD   vitamin B-12 (CYANOCOBALAMIN) 1000 MCG tablet Take 1 tablet by mouth Daily. 7/23/24   Seng Brar MD   zolpidem (AMBIEN) 10 MG tablet Take 1 tablet by mouth At Night As Needed for Sleep.    Seng Brar MD       Surgical History:   Past Surgical History:   Procedure Laterality Date    SKIN CANCER EXCISION  07/2019    Back- Basal Cell    SKIN CANCER EXCISION  2022    Back of neck       Allergies:   Allergies   Allergen Reactions    Codeine Sulfate Other (See Comments)     Childhood reaction, reported  by mother. Patient does not remember reaction        Social History:   Social History     Socioeconomic History    Marital status:    Tobacco Use    Smoking status: Never    Smokeless tobacco: Never   Vaping Use    Vaping status: Never Used   Substance and Sexual Activity    Alcohol use: Yes     Alcohol/week: 2.0 - 3.0 standard drinks of alcohol     Types: 2 - 3 Drinks containing 0.5 oz of alcohol per week     Comment: 2-3 drinks daily    Drug use: No    Sexual activity: Yes     Partners: Female     Birth control/protection: Partner of same sex       Family History:   Family History   Problem Relation Age of Onset    Breast cancer Mother     Cancer Mother         Breast Cancer--2020    Colon cancer Father     Cancer Father         Colorectal cancer--2023       Objective     There were no vitals taken for this visit.  No intake or output data in the 24 hours ending 04/01/25 1439      Physical Exam:  Physical Exam  Cardiovascular:      Rate and Rhythm: Normal rate.      Heart sounds: Normal heart sounds. Murmur heard.   Abdominal:      General: Abdomen is flat.   Musculoskeletal:         General: Normal range of motion.   Skin:     General: Skin is warm.   Neurological:      General: No focal deficit present.      Mental Status: He is alert.           Labs:  Results from last 7 days   Lab Units 03/26/25  1108   SODIUM mmol/L 136   POTASSIUM mmol/L 4.1   CHLORIDE mmol/L 100   CO2 mmol/L 22.8   BUN mg/dL 15   CREATININE mg/dL 1.34*   GLUCOSE mg/dL 95   CALCIUM mg/dL 9.1         Results from last 7 days   Lab Units 03/26/25  1108   WBC 10*3/mm3 10.26   HEMOGLOBIN g/dL 14.7   HEMATOCRIT % 44.0   PLATELETS 10*3/mm3 325             Results from last 7 days   Lab Units 03/26/25  1108   TSH uIU/mL 0.307         Results from last 7 days   Lab Units 03/26/25  1108   PROBNP pg/mL 139.0       Sed Rate 13     HS C-Reactive Protein 9.110, elevated        Imaging/Diagnostics:     EKG: 3/26/25    Performed by: Darrell Márquez,  MD     Authorized by: Darrell Márquez MD  Comparison: not compared with previous ECG   Previous ECG: no previous ECG available  Rhythm: sinus rhythm  Rate: normal  BPM: 83  Conduction: conduction normal  ST Elevation: all (Mild, diffuse ST elevation)  QRS axis: normal  Clinical impression: abnormal EKG    CHEST X-RAY IMPRESSION: 3/5/25  Large cardiac silhouette. Mild extrinsic compression on trachea suggested but unchanged from many previous chest x-rays. No other acute cardiopulmonary disease process.     MOST RECENT ECHO IMPRESSION: 3/26/25    Left ventricular systolic function is normal. Calculated left ventricular EF = 64% Left ventricular ejection fraction appears to be 61 - 65%.    Left ventricular wall thickness is consistent with mild concentric hypertrophy. Left ventricular diastolic function was normal.    Normal right ventricular size and function.    Moderate to severe aortic valve stenosis is present. (Vmax 3.6 m/s. mPG 33 mmHg. ELVA 0.6 cm2. DI 0.19.)    There is a moderate to large circumferential pericardial effusion with right atrial free wall  collapse present.  Maximal diameter of 2.3-2.5 cm along the anterolateral free wall of the left ventricle.  There is no echocardiographic evidence of cardiac tamponade.  IVC is of normal size and not plethoric.      Assessment and Plan:     ASSESSMENT:   Pericardial Effusion  Colchicine 1.2 mg loading dose in office 3/26 followed by 0.6 BID for 3 months with ibuprofen PRN for pain      PLAN:  Pericardiocentesis for persistent pericardial effusion   The risks, benefits, and alternatives of the procedure have         been reviewed and the patient wishes to proceed.     ANYA Leroy

## 2025-04-02 ENCOUNTER — HOSPITAL ENCOUNTER (INPATIENT)
Facility: HOSPITAL | Age: 60
LOS: 1 days | Discharge: HOME | End: 2025-04-03
Attending: INTERNAL MEDICINE | Admitting: INTERNAL MEDICINE
Payer: COMMERCIAL

## 2025-04-02 ENCOUNTER — APPOINTMENT (OUTPATIENT)
Dept: CARDIOLOGY | Facility: HOSPITAL | Age: 60
End: 2025-04-02
Payer: COMMERCIAL

## 2025-04-02 DIAGNOSIS — I31.39 PERICARDIAL EFFUSION: ICD-10-CM

## 2025-04-02 PROBLEM — Z98.890 STATUS POST PERICARDIOCENTESIS: Status: ACTIVE | Noted: 2025-04-02

## 2025-04-02 LAB
ANION GAP SERPL CALCULATED.3IONS-SCNC: 14 MMOL/L (ref 5–15)
APPEARANCE FLD: ABNORMAL
BUN SERPL-MCNC: 9 MG/DL (ref 6–20)
BUN/CREAT SERPL: 9 (ref 7–25)
CALCIUM SPEC-SCNC: 9.2 MG/DL (ref 8.6–10.5)
CHLORIDE SERPL-SCNC: 97 MMOL/L (ref 98–107)
CO2 SERPL-SCNC: 23 MMOL/L (ref 22–29)
COLOR FLD: ABNORMAL
CREAT SERPL-MCNC: 1 MG/DL (ref 0.76–1.27)
EGFRCR SERPLBLD CKD-EPI 2021: 87.8 ML/MIN/1.73
EOSINOPHIL NFR FLD MANUAL: 4 %
GLUCOSE SERPL-MCNC: 89 MG/DL (ref 65–99)
LYMPHOCYTES NFR FLD MANUAL: 38 %
MACROPHAGE FLUID %: 2 %
MESOTHL CELL NFR FLD MANUAL: 39 %
MONOCYTES NFR FLD: 8 %
NEUTROPHILS NFR FLD MANUAL: 9 %
NT-PROBNP SERPL-MCNC: 496.4 PG/ML (ref 0–900)
POTASSIUM SERPL-SCNC: 3.9 MMOL/L (ref 3.5–5.2)
POTASSIUM SERPL-SCNC: 4.2 MMOL/L (ref 3.5–5.2)
RBC # FLD AUTO: ABNORMAL /MM3
SODIUM SERPL-SCNC: 134 MMOL/L (ref 136–145)
TSH SERPL DL<=0.05 MIU/L-ACNC: 1.29 UIU/ML (ref 0.27–4.2)
WBC # FLD AUTO: 526 /MM3

## 2025-04-02 PROCEDURE — 33017 PRCRD DRG 6YR+ W/O CGEN CAR: CPT | Performed by: INTERNAL MEDICINE

## 2025-04-02 PROCEDURE — 80048 BASIC METABOLIC PNL TOTAL CA: CPT | Performed by: PHYSICIAN ASSISTANT

## 2025-04-02 PROCEDURE — 0W9D3ZZ DRAINAGE OF PERICARDIAL CAVITY, PERCUTANEOUS APPROACH: ICD-10-PCS | Performed by: INTERNAL MEDICINE

## 2025-04-02 PROCEDURE — 25810000003 SODIUM CHLORIDE 0.9 % SOLUTION 250 ML FLEX CONT: Performed by: INTERNAL MEDICINE

## 2025-04-02 PROCEDURE — C1769 GUIDE WIRE: HCPCS | Performed by: INTERNAL MEDICINE

## 2025-04-02 PROCEDURE — 83880 ASSAY OF NATRIURETIC PEPTIDE: CPT | Performed by: PHYSICIAN ASSISTANT

## 2025-04-02 PROCEDURE — 83615 LACTATE (LD) (LDH) ENZYME: CPT | Performed by: PHYSICIAN ASSISTANT

## 2025-04-02 PROCEDURE — C1729 CATH, DRAINAGE: HCPCS | Performed by: INTERNAL MEDICINE

## 2025-04-02 PROCEDURE — 93308 TTE F-UP OR LMTD: CPT

## 2025-04-02 PROCEDURE — 25010000002 FENTANYL CITRATE (PF) 50 MCG/ML SOLUTION: Performed by: INTERNAL MEDICINE

## 2025-04-02 PROCEDURE — 99222 1ST HOSP IP/OBS MODERATE 55: CPT | Performed by: INTERNAL MEDICINE

## 2025-04-02 PROCEDURE — 87116 MYCOBACTERIA CULTURE: CPT | Performed by: PHYSICIAN ASSISTANT

## 2025-04-02 PROCEDURE — 82945 GLUCOSE OTHER FLUID: CPT | Performed by: PHYSICIAN ASSISTANT

## 2025-04-02 PROCEDURE — 25010000002 MIDAZOLAM PER 1 MG: Performed by: INTERNAL MEDICINE

## 2025-04-02 PROCEDURE — 82042 OTHER SOURCE ALBUMIN QUAN EA: CPT | Performed by: PHYSICIAN ASSISTANT

## 2025-04-02 PROCEDURE — 87205 SMEAR GRAM STAIN: CPT | Performed by: PHYSICIAN ASSISTANT

## 2025-04-02 PROCEDURE — 87070 CULTURE OTHR SPECIMN AEROBIC: CPT | Performed by: PHYSICIAN ASSISTANT

## 2025-04-02 PROCEDURE — 87206 SMEAR FLUORESCENT/ACID STAI: CPT | Performed by: PHYSICIAN ASSISTANT

## 2025-04-02 PROCEDURE — 87015 SPECIMEN INFECT AGNT CONCNTJ: CPT | Performed by: PHYSICIAN ASSISTANT

## 2025-04-02 PROCEDURE — 93005 ELECTROCARDIOGRAM TRACING: CPT | Performed by: INTERNAL MEDICINE

## 2025-04-02 PROCEDURE — 93010 ELECTROCARDIOGRAM REPORT: CPT | Performed by: INTERNAL MEDICINE

## 2025-04-02 PROCEDURE — 84132 ASSAY OF SERUM POTASSIUM: CPT | Performed by: INTERNAL MEDICINE

## 2025-04-02 PROCEDURE — 25010000002 LIDOCAINE PF 1% 1 % SOLUTION: Performed by: INTERNAL MEDICINE

## 2025-04-02 PROCEDURE — 93308 TTE F-UP OR LMTD: CPT | Performed by: INTERNAL MEDICINE

## 2025-04-02 PROCEDURE — 84157 ASSAY OF PROTEIN OTHER: CPT | Performed by: PHYSICIAN ASSISTANT

## 2025-04-02 PROCEDURE — 25010000002 PHENYLEPHRINE 10 MG/ML SOLUTION: Performed by: INTERNAL MEDICINE

## 2025-04-02 PROCEDURE — 25010000002 PHENYLEPHRINE 10 MG/ML SOLUTION 5 ML VIAL: Performed by: INTERNAL MEDICINE

## 2025-04-02 PROCEDURE — 89051 BODY FLUID CELL COUNT: CPT | Performed by: PHYSICIAN ASSISTANT

## 2025-04-02 PROCEDURE — 84443 ASSAY THYROID STIM HORMONE: CPT | Performed by: INTERNAL MEDICINE

## 2025-04-02 RX ORDER — FENTANYL CITRATE 50 UG/ML
INJECTION, SOLUTION INTRAMUSCULAR; INTRAVENOUS
Status: DISCONTINUED | OUTPATIENT
Start: 2025-04-02 | End: 2025-04-02 | Stop reason: HOSPADM

## 2025-04-02 RX ORDER — LEVOTHYROXINE SODIUM 125 UG/1
125 TABLET ORAL DAILY
Status: DISCONTINUED | OUTPATIENT
Start: 2025-04-03 | End: 2025-04-03 | Stop reason: HOSPADM

## 2025-04-02 RX ORDER — SODIUM CHLORIDE 0.9 % (FLUSH) 0.9 %
10 SYRINGE (ML) INJECTION EVERY 12 HOURS SCHEDULED
Status: DISCONTINUED | OUTPATIENT
Start: 2025-04-02 | End: 2025-04-03 | Stop reason: HOSPADM

## 2025-04-02 RX ORDER — ZOLPIDEM TARTRATE 5 MG/1
10 TABLET ORAL NIGHTLY PRN
Status: DISCONTINUED | OUTPATIENT
Start: 2025-04-02 | End: 2025-04-03 | Stop reason: HOSPADM

## 2025-04-02 RX ORDER — POTASSIUM CHLORIDE 1500 MG/1
20 TABLET, EXTENDED RELEASE ORAL ONCE
Status: COMPLETED | OUTPATIENT
Start: 2025-04-02 | End: 2025-04-02

## 2025-04-02 RX ORDER — SODIUM CHLORIDE 9 MG/ML
40 INJECTION, SOLUTION INTRAVENOUS AS NEEDED
Status: DISCONTINUED | OUTPATIENT
Start: 2025-04-02 | End: 2025-04-03 | Stop reason: HOSPADM

## 2025-04-02 RX ORDER — SODIUM CHLORIDE 0.9 % (FLUSH) 0.9 %
10 SYRINGE (ML) INJECTION AS NEEDED
Status: DISCONTINUED | OUTPATIENT
Start: 2025-04-02 | End: 2025-04-03 | Stop reason: HOSPADM

## 2025-04-02 RX ORDER — MIDAZOLAM HYDROCHLORIDE 1 MG/ML
INJECTION, SOLUTION INTRAMUSCULAR; INTRAVENOUS
Status: DISCONTINUED | OUTPATIENT
Start: 2025-04-02 | End: 2025-04-02 | Stop reason: HOSPADM

## 2025-04-02 RX ORDER — NITROGLYCERIN 0.4 MG/1
0.4 TABLET SUBLINGUAL
Status: DISCONTINUED | OUTPATIENT
Start: 2025-04-02 | End: 2025-04-03 | Stop reason: HOSPADM

## 2025-04-02 RX ORDER — LIDOCAINE HYDROCHLORIDE 10 MG/ML
INJECTION, SOLUTION EPIDURAL; INFILTRATION; INTRACAUDAL; PERINEURAL
Status: DISCONTINUED | OUTPATIENT
Start: 2025-04-02 | End: 2025-04-02 | Stop reason: HOSPADM

## 2025-04-02 RX ORDER — PANTOPRAZOLE SODIUM 40 MG/1
40 TABLET, DELAYED RELEASE ORAL
Status: DISCONTINUED | OUTPATIENT
Start: 2025-04-03 | End: 2025-04-03 | Stop reason: HOSPADM

## 2025-04-02 RX ORDER — PHENYLEPHRINE HYDROCHLORIDE 10 MG/ML
INJECTION INTRAVENOUS
Status: DISCONTINUED | OUTPATIENT
Start: 2025-04-02 | End: 2025-04-02 | Stop reason: HOSPADM

## 2025-04-02 RX ORDER — ALPRAZOLAM 1 MG/1
1 TABLET ORAL DAILY PRN
Status: DISCONTINUED | OUTPATIENT
Start: 2025-04-02 | End: 2025-04-03

## 2025-04-02 RX ORDER — ACETAMINOPHEN 325 MG/1
650 TABLET ORAL EVERY 4 HOURS PRN
Status: DISCONTINUED | OUTPATIENT
Start: 2025-04-02 | End: 2025-04-03 | Stop reason: HOSPADM

## 2025-04-02 RX ADMIN — Medication 10 ML: at 20:29

## 2025-04-02 RX ADMIN — Medication 10 ML: at 10:46

## 2025-04-02 RX ADMIN — ACETAMINOPHEN 650 MG: 325 TABLET, FILM COATED ORAL at 15:02

## 2025-04-02 RX ADMIN — ALPRAZOLAM 1 MG: 1 TABLET ORAL at 20:52

## 2025-04-02 RX ADMIN — MUPIROCIN 1 APPLICATION: 20 OINTMENT TOPICAL at 20:27

## 2025-04-02 RX ADMIN — POTASSIUM CHLORIDE 20 MEQ: 20 TABLET, EXTENDED RELEASE ORAL at 10:32

## 2025-04-02 RX ADMIN — ACETAMINOPHEN 650 MG: 325 TABLET, FILM COATED ORAL at 20:52

## 2025-04-02 NOTE — Clinical Note
Pericardiocentesis performed by manual aspiration.Serosanguineous fluid removed and 20 mL was sent to lab for analysis..

## 2025-04-02 NOTE — PLAN OF CARE
Goal Outcome Evaluation:           Progress: improving  Outcome Evaluation: Patient arrived to the unit around 1000. VSS on RA-2L NC. BP stable. Pericardial drain remains in place. UOP adeqaute, no BM. Diet placed. Potassium replaced. PRN tylenol given x1 for pain control. Family at bedside and updated.

## 2025-04-02 NOTE — PROGRESS NOTES
INTENSIVIST   PROGRESS NOTE         SUBJECTIVE     Mr. Angel Campbell, 59 y.o. male is followed for: No chief complaint on file.       S/P Procedure(s) (LRB):  Pericardiocentesis (N/A)   Perioperative medical management of comorbid conditions, including glycemic control and electrolytes disorders.    As an Intensivist, we have completed an accredited Critical Care program and maintain Board Certification and dedicate most of our professional work to critical/intensive care. We serve as the  or member of an interprofessional team, coordinating and guiding healthcare professionals to provide specialized care for critically ill patients. We manage and resuscitate patients with, or at risk for, critical illness and organ failure and initiate interventions to prevent imminent deterioration. (2024 Consensus Statement from the Society of Critical Care Medicine Defining Intensivist Task Force. [facility] 2025. DOI: 10.1097/[facility].[id number]     Interval History:  POD: * Day of Surgery *    He was seen in 2A ICU upon arrival from Cath Lab.    He was admitted on 4/2/2025 for a pericardiocentesis.    Events from surgery were reviewed.    At present, he is doing well.   Comfortable.   No distress.   No chest pain.    Temp  Min: 98 °F (36.7 °C)  Max: 98.1 °F (36.7 °C)     PMH: He  has a past medical history of Allergic rhinitis, Anxiety (30+ years), Asthma (March 2024), GERD (gastroesophageal reflux disease), Heart murmur (30+ years), Heart valve disease (2019), Hiatal hernia (1990), Hodgkin lymphoma, Hodgkin's disease, Hyperlipidemia (2022), Hypertension, Hypothyroidism, and Sleep apnea, obstructive.   PSxH: He  has a past surgical history that includes Skin cancer excision (07/2019) and Skin cancer excision (2022).      Medications:  No current facility-administered medications on file prior to encounter.     Current Outpatient Medications on File Prior to Encounter   Medication Sig    ALPRAZolam (XANAX) 1 MG tablet Take  1 tablet by mouth Daily As Needed for Anxiety.    cetirizine (ZyrTEC) 10 MG tablet Take 1 tablet by mouth As Needed.    colchicine 0.6 MG tablet Take 1 tablet by mouth 2 (Two) Times a Day.    esomeprazole (NexIUM) 20 MG capsule Take 1 capsule by mouth Daily.    levothyroxine (SYNTHROID, LEVOTHROID) 125 MCG tablet TAKE 1 TABLET BY MOUTH ONCE DAILY    Tirzepatide-Weight Management (Zepbound) 2.5 MG/0.5ML solution auto-injector Inject 2 mL under the skin into the appropriate area as directed 1 (One) Time Per Week.    valsartan (DIOVAN) 160 MG tablet TAKE 2 TABLETS PO QD    zolpidem (AMBIEN) 10 MG tablet Take 1 tablet by mouth At Night As Needed for Sleep.    [DISCONTINUED] vitamin B-12 (CYANOCOBALAMIN) 1000 MCG tablet Take 1 tablet by mouth Daily.       Allergies: He is allergic to codeine sulfate.   FH: His family history includes Breast cancer in his mother; Cancer in his father and mother; Colon cancer in his father.   SH: He  reports that he has never smoked. He has never used smokeless tobacco. He reports current alcohol use of about 2.0 - 3.0 standard drinks of alcohol per week. He reports that he does not use drugs.     The patient's relevant past medical, surgical and social history were reviewed and updated in Epic as appropriate.       History     Last Reviewed by Darrell Rodriguez MD on 4/2/2025 at 10:40 AM    Sections Reviewed    Medical, Surgical, Family, Tobacco, Alcohol, Drug Use, Sexual Activity,   Social Documentation    Problem list reviewed by Darrell Rodriguez MD on 4/2/2025 at 10:40 AM  Medicines reviewed by Darrell Rodriguez MD on 4/2/2025 at 10:40 AM  Allergies reviewed by Darrell Rodriguez MD on 4/2/2025 at 10:40 AM       ROS:   As per HPI        OBJECTIVE     Vitals:  Temp: 98 °F (36.7 °C) (04/02/25 1004) Temp  Min: 98 °F (36.7 °C)  Max: 98.1 °F (36.7 °C)   Temp core:      BP: 143/83 (04/02/25 0940) BP  Min: 143/83  Max: 164/93   MAP (non-invasive) Noninvasive MAP (mmHg): 116 (04/02/25 0640)  Noninvasive MAP (mmHg)  Av  Min: 116  Max: 116   Pulse: 83 (25 100) Pulse  Min: 74  Max: 83   Resp: 17 (25 100) Resp  Min: 16  Max: 17   SpO2: 95 % (25 100) SpO2  Min: 93 %  Max: 95 %   Device: room air (25 100)    Flow Rate: 2 (25 0833) Flow (L/min) (Oxygen Therapy)  Min: 2  Max: 2         25  0639 25   Weight: 90.8 kg (200 lb 3.2 oz) 91.2 kg (201 lb 1 oz)        Intake/Ouptut 24 hrs (7:00AM - 6:59 AM)  Intake & Output (last 2 days)          0701   0700  0701   07 07 07    Drains   340    Total Output   340    Net   -340                   Medications (drips):       Physical Examination  Telemetry:  Rhythm: normal sinus rhythm (25 0945)      Constitutional:  No acute distress.   Cardiovascular: RRR.    Respiratory: Normal breath sounds  No adventitious sounds   Abdominal:  Soft with no tenderness.   Extremities: No Edema   Neurological:   Alert, Oriented, Cooperative.                             Hematology:  Results from last 7 days   Lab Units 25  1108   WBC 10*3/mm3 10.26   HEMOGLOBIN g/dL 14.7   MCV fL 93.0   PLATELETS 10*3/mm3 325         Chemistry:  Estimated Creatinine Clearance: 90.1 mL/min (by C-G formula based on SCr of 1 mg/dL).    Results from last 7 days   Lab Units 25  0643 25  1108   SODIUM mmol/L 134* 136   POTASSIUM mmol/L 3.9 4.1   CHLORIDE mmol/L 97* 100   CO2 mmol/L 23.0 22.8   BUN mg/dL 9 15   CREATININE mg/dL 1.00 1.34*   GLUCOSE mg/dL 89 95     Results from last 7 days   Lab Units 25  0643 25  1108   CALCIUM mg/dL 9.2 9.1     Hepatic Panel:  Results from last 7 days   Lab Units 25  1108   ALBUMIN g/dL 4.2   TOTAL PROTEIN g/dL 7.3   BILIRUBIN mg/dL 1.2   AST (SGOT) U/L 17   ALT (SGPT) U/L 20   ALK PHOS U/L 64        Images:  No radiology results for the last day    Echo:  Results for orders placed during the hospital encounter of 25    Adult  Transthoracic Echo Complete W/ Cont if Necessary Per Protocol    Interpretation Summary    Left ventricular systolic function is normal. Calculated left ventricular EF = 64% Left ventricular ejection fraction appears to be 61 - 65%.    Left ventricular wall thickness is consistent with mild concentric hypertrophy. Left ventricular diastolic function was normal.    Normal right ventricular size and function.    Moderate to severe aortic valve stenosis is present. (Vmax 3.6 m/s. mPG 33 mmHg. ELVA 0.6 cm2. DI 0.19.)    There is a moderate to large circumferential pericardial effusion with right atrial free wall  collapse present.  Maximal diameter of 2.3-2.5 cm along the anterolateral free wall of the left ventricle.  There is no echocardiographic evidence of cardiac tamponade.  IVC is of normal size and not plethoric.      Results: Reviewed.  I reviewed the patient's new laboratory and imaging results.  I independently reviewed the patient's new images.    Medications: Reviewed.      Assessment   A/P     Hospital:  LOS: 0 days   ICU: 35m     Active Hospital Problems    Diagnosis  POA    **Pericardial effusion [I31.39]  Yes    Status post pericardiocentesis [Z98.890]  Not Applicable     Angel is a 59 y.o. male admitted on 4/2/2025 with Pericardial effusion [I31.39]  Status post pericardiocentesis [Z98.890]     Assessment/Management/Treatment Plan:    Pericardial effusion  Procedure(s) (LRB):  Pericardiocentesis (N/A)   Dr. Garcias  04/02/25     Cardiovascular  Aortic stenosis, non-rheumatic  HTN  Dyslipidemia   Non smoker.  Sleep Medicine  CHANG  GI/Hepatology  GERD  Endocrine   Body mass index is 30.13 kg/m². Obese Class I: 30-34.9kg/m2  Hypothyroidism  No history of Diabetes    Diet: NPO Diet NPO Type: Strict NPO  No active supplement orders      Advance Directives: There are no questions and answers to display.        VTE Prophylaxis:  Mechanical VTE prophylaxis orders are present.    In brief:  ICU post operative medical  management.  Hemodynamic management. Adequate preload.  Urinary Output > = 0.5 mL/kg/hr  Watch for arrhythmias.  Watch for bleeding.  Insulin Sub Q as clinically indicated. Avoid oral antidiabetic medications. Target glucose < = 180 mg/dL.   Disposition: Admit to ICU    Plan of care and goals reviewed during interdisciplinary rounds.  I discussed the patient's findings and my recommendations with patient, family, and nursing staff    MDM:    Problem(s) High due to: Acute or Chronic illness or injury that may poses a threat to life or bodily function  Data: Moderate due to: Review of prior external records from each unique source, Review or results of each unique test, and Ordering of each unique test    Moderate    [] Primary Attending Intensive Care Medicine - Nutrition Support   [x] Consultant    Copied text in this note has been reviewed and is accurate as of 04/02/25

## 2025-04-03 ENCOUNTER — RESULTS FOLLOW-UP (OUTPATIENT)
Dept: CARDIOLOGY | Facility: HOSPITAL | Age: 60
End: 2025-04-03
Payer: COMMERCIAL

## 2025-04-03 ENCOUNTER — APPOINTMENT (OUTPATIENT)
Dept: CARDIOLOGY | Facility: HOSPITAL | Age: 60
End: 2025-04-03
Payer: COMMERCIAL

## 2025-04-03 VITALS
SYSTOLIC BLOOD PRESSURE: 123 MMHG | HEART RATE: 96 BPM | TEMPERATURE: 98.8 F | DIASTOLIC BLOOD PRESSURE: 77 MMHG | WEIGHT: 201.06 LBS | RESPIRATION RATE: 18 BRPM | HEIGHT: 69 IN | BODY MASS INDEX: 29.78 KG/M2 | OXYGEN SATURATION: 94 %

## 2025-04-03 LAB
ALBUMIN FLD-MCNC: 3.4 G/DL
ANION GAP SERPL CALCULATED.3IONS-SCNC: 10 MMOL/L (ref 5–15)
BASOPHILS # BLD AUTO: 0.09 10*3/MM3 (ref 0–0.2)
BASOPHILS NFR BLD AUTO: 0.7 % (ref 0–1.5)
BUN SERPL-MCNC: 10 MG/DL (ref 6–20)
BUN/CREAT SERPL: 14.3 (ref 7–25)
CALCIUM SPEC-SCNC: 8.8 MG/DL (ref 8.6–10.5)
CHLORIDE SERPL-SCNC: 97 MMOL/L (ref 98–107)
CO2 SERPL-SCNC: 21 MMOL/L (ref 22–29)
CREAT SERPL-MCNC: 0.7 MG/DL (ref 0.76–1.27)
DEPRECATED RDW RBC AUTO: 42.9 FL (ref 37–54)
EGFRCR SERPLBLD CKD-EPI 2021: 107.5 ML/MIN/1.73
EOSINOPHIL # BLD AUTO: 0.06 10*3/MM3 (ref 0–0.4)
EOSINOPHIL NFR BLD AUTO: 0.5 % (ref 0.3–6.2)
ERYTHROCYTE [DISTWIDTH] IN BLOOD BY AUTOMATED COUNT: 12.9 % (ref 12.3–15.4)
GLUCOSE FLD-MCNC: 81 MG/DL
GLUCOSE SERPL-MCNC: 103 MG/DL (ref 65–99)
HCT VFR BLD AUTO: 39.4 % (ref 37.5–51)
HGB BLD-MCNC: 13.3 G/DL (ref 13–17.7)
IMM GRANULOCYTES # BLD AUTO: 0.07 10*3/MM3 (ref 0–0.05)
IMM GRANULOCYTES NFR BLD AUTO: 0.5 % (ref 0–0.5)
LDH FLD L TO P-CCNC: 396 IU/L
LYMPHOCYTES # BLD AUTO: 0.78 10*3/MM3 (ref 0.7–3.1)
LYMPHOCYTES NFR BLD AUTO: 6.1 % (ref 19.6–45.3)
MCH RBC QN AUTO: 30.6 PG (ref 26.6–33)
MCHC RBC AUTO-ENTMCNC: 33.8 G/DL (ref 31.5–35.7)
MCV RBC AUTO: 90.8 FL (ref 79–97)
MONOCYTES # BLD AUTO: 1.09 10*3/MM3 (ref 0.1–0.9)
MONOCYTES NFR BLD AUTO: 8.5 % (ref 5–12)
NEUTROPHILS NFR BLD AUTO: 10.72 10*3/MM3 (ref 1.7–7)
NEUTROPHILS NFR BLD AUTO: 83.7 % (ref 42.7–76)
NRBC BLD AUTO-RTO: 0 /100 WBC (ref 0–0.2)
PLATELET # BLD AUTO: 382 10*3/MM3 (ref 140–450)
PMV BLD AUTO: 8.3 FL (ref 6–12)
POTASSIUM SERPL-SCNC: 4.1 MMOL/L (ref 3.5–5.2)
PROT FLD-MCNC: 5.1 G/DL
QT INTERVAL: 410 MS
QTC INTERVAL: 476 MS
RBC # BLD AUTO: 4.34 10*6/MM3 (ref 4.14–5.8)
REF LAB TEST METHOD: NORMAL
SODIUM SERPL-SCNC: 128 MMOL/L (ref 136–145)
WBC NRBC COR # BLD AUTO: 12.81 10*3/MM3 (ref 3.4–10.8)

## 2025-04-03 PROCEDURE — 99239 HOSP IP/OBS DSCHRG MGMT >30: CPT | Performed by: INTERNAL MEDICINE

## 2025-04-03 PROCEDURE — 93325 DOPPLER ECHO COLOR FLOW MAPG: CPT

## 2025-04-03 PROCEDURE — 93321 DOPPLER ECHO F-UP/LMTD STD: CPT

## 2025-04-03 PROCEDURE — 99232 SBSQ HOSP IP/OBS MODERATE 35: CPT | Performed by: INTERNAL MEDICINE

## 2025-04-03 PROCEDURE — 93325 DOPPLER ECHO COLOR FLOW MAPG: CPT | Performed by: INTERNAL MEDICINE

## 2025-04-03 PROCEDURE — 93308 TTE F-UP OR LMTD: CPT | Performed by: INTERNAL MEDICINE

## 2025-04-03 PROCEDURE — 85025 COMPLETE CBC W/AUTO DIFF WBC: CPT | Performed by: INTERNAL MEDICINE

## 2025-04-03 PROCEDURE — 93308 TTE F-UP OR LMTD: CPT

## 2025-04-03 PROCEDURE — 93321 DOPPLER ECHO F-UP/LMTD STD: CPT | Performed by: INTERNAL MEDICINE

## 2025-04-03 PROCEDURE — 80048 BASIC METABOLIC PNL TOTAL CA: CPT | Performed by: INTERNAL MEDICINE

## 2025-04-03 RX ORDER — ALPRAZOLAM 1 MG/1
1 TABLET ORAL 3 TIMES DAILY PRN
Status: DISCONTINUED | OUTPATIENT
Start: 2025-04-03 | End: 2025-04-03 | Stop reason: HOSPADM

## 2025-04-03 RX ORDER — COLCHICINE 0.6 MG/1
0.6 TABLET ORAL EVERY 12 HOURS SCHEDULED
Status: DISCONTINUED | OUTPATIENT
Start: 2025-04-03 | End: 2025-04-03 | Stop reason: HOSPADM

## 2025-04-03 RX ADMIN — MUPIROCIN 1 APPLICATION: 20 OINTMENT TOPICAL at 08:34

## 2025-04-03 RX ADMIN — ACETAMINOPHEN 650 MG: 325 TABLET, FILM COATED ORAL at 05:09

## 2025-04-03 RX ADMIN — COLCHICINE 0.6 MG: 0.6 TABLET ORAL at 09:20

## 2025-04-03 RX ADMIN — ALPRAZOLAM 1 MG: 1 TABLET ORAL at 06:50

## 2025-04-03 RX ADMIN — PANTOPRAZOLE SODIUM 40 MG: 40 TABLET, DELAYED RELEASE ORAL at 05:09

## 2025-04-03 RX ADMIN — Medication 10 ML: at 10:13

## 2025-04-03 RX ADMIN — LEVOTHYROXINE SODIUM 125 MCG: 125 TABLET ORAL at 08:34

## 2025-04-03 NOTE — PROGRESS NOTES
"[facility]  IP Progress Note    HOSPITAL COURSE:  Patient has successful drainage of about 1000 cc overnight.  Will get an echo and we will pull the drain      CHIEF COMPLAINTS:  Shortness of breath    Subjective   Patient is feeling much better.      Objective     Blood pressure 94/69, pulse 82, temperature 98.2 °F (36.8 °C), temperature source Oral, resp. rate 16, height 174 cm (68.5\"), weight 91.2 kg (201 lb 1 oz), SpO2 92%.     Intake/Output Summary (Last 24 hours) at 4/3/2025 0801  Last data filed at 4/3/2025 0500  Gross per 24 hour   Intake --   Output 1265 ml   Net -1265 ml       PHYSICAL EXAM:  Constitutional:       General: Not in acute distress.     Appearance: Healthy appearance. Not in distress.     Neck:     JVP:Not elevated     Carotid artery: Normal    Pulmonary:      Effort: Pulmonary effort is normal.      Breath sounds: Normal breath sounds. No wheezing. No rhonchi. No rales.     Cardiovascular:      Normal rate. Regular rhythm. Normal S1. Normal S2.      Murmurs: There is 2/6 systolic murmur.      No gallop. No click. No rub.     Abdominal:      General: Bowel sounds are normal.      Palpations: Abdomen is soft.      Tenderness: There is no abdominal tenderness.    Extremities:     Pulses:Normal radial and pedal pulses     Edema:no edema    MEDICATIONS:    levothyroxine, 125 mcg, Oral, Daily  mupirocin, 1 Application, Each Nare, BID  pantoprazole, 40 mg, Oral, Q AM  sodium chloride, 10 mL, Intravenous, Q12H          Results from last 7 days   Lab Units 04/03/25  0344   WBC 10*3/mm3 12.81*   HEMOGLOBIN g/dL 13.3   HEMATOCRIT % 39.4   PLATELETS 10*3/mm3 382     Results from last 7 days   Lab Units 04/03/25  0344   SODIUM mmol/L 128*   POTASSIUM mmol/L 4.1   CHLORIDE mmol/L 97*   CO2 mmol/L 21.0*   BUN mg/dL 10   CREATININE mg/dL 0.70*   CALCIUM mg/dL 8.8   GLUCOSE mg/dL 103*         Lab Results   Component Value Date    TROPONINT 10 04/26/2024     Results from " last 7 days   Lab Units 04/02/25  0643   TSH uIU/mL 1.290             RESULT REVIEW:    I reviewed the patient's new clinical results.    Tele: Sinus Rythym      ASSESSMENT:     Pericardial effusion status post pericardiocentesis  History of lymphoma status postchemotherapy    PLAN:     Patient has stopped the drainage and he has about 1000 cc out.  His sodium is low today and will be repeated before he get discharged.  He will continue his colchicine as directed by his primary cardiologist.  He will follow-up with him after echo is done.

## 2025-04-03 NOTE — PROGRESS NOTES
INTENSIVIST   PROGRESS NOTE         SUBJECTIVE     Mr. Angel Campbell, 59 y.o. male is followed for: No chief complaint on file.       S/P Procedure(s) (LRB):  Pericardiocentesis (N/A)   Perioperative medical management of comorbid conditions, including glycemic control and electrolytes disorders.    As an Intensivist, we have completed an accredited Critical Care program and maintain Board Certification and dedicate most of our professional work to critical/intensive care. We serve as the  or member of an interprofessional team, coordinating and guiding healthcare professionals to provide specialized care for critically ill patients. We manage and resuscitate patients with, or at risk for, critical illness and organ failure and initiate interventions to prevent imminent deterioration. (2024 Consensus Statement from the Society of Critical Care Medicine Defining Intensivist Task Force. [facility] 2025. DOI: 10.1097/[facility].[id number]     Interval History:  POD: 1 Day Post-Op (Pericardiocentesis)    Doing well.    No chest pain.    He could not sleep last night (storm, nurses checking on him constantly).    Temp  Min: 98 °F (36.7 °C)  Max: 98.2 °F (36.8 °C)       The patient's relevant past medical, surgical and social history were reviewed and updated in Epic as appropriate.       History     Last Reviewed by Darrell Rodriguez MD on 4/2/2025 at 10:40 AM    Sections Reviewed    Medical, Surgical, Family, Tobacco, Alcohol, Drug Use, Sexual Activity,   Social Documentation    Problem list reviewed by Darrell Rodriguez MD on 4/2/2025 at 10:40 AM  Medicines reviewed by Darrell Rodriguez MD on 4/2/2025 at 10:40 AM  Allergies reviewed by Darrell Rodriguez MD on 4/2/2025 at 10:40 AM         OBJECTIVE     Vitals:  Temp: 98.2 °F (36.8 °C) (04/03/25 0400) Temp  Min: 98 °F (36.7 °C)  Max: 98.2 °F (36.8 °C)   Temp core:      BP: 94/69 (04/03/25 0630) BP  Min: 80/56  Max: 164/93   MAP (non-invasive) Noninvasive MAP (mmHg): 78  (25) Noninvasive MAP (mmHg)  Av  Min: 64  Max: 116   Pulse: 82 (25) Pulse  Min: 74  Max: 106   Resp: 16 (25) Resp  Min: 16  Max: 20   SpO2: 92 % (25) SpO2  Min: 89 %  Max: 97 %   Device: nasal cannula with ETCO2 (25)    Flow Rate: 3 (25) Flow (L/min) (Oxygen Therapy)  Min: 2  Max: 3         25  0639 25  1004   Weight: 90.8 kg (200 lb 3.2 oz) 91.2 kg (201 lb 1 oz)        Intake/Ouptut 24 hrs (7:00AM - 6:59 AM)  Intake & Output (last 2 days)          07 07 07 07 07 07    Urine (mL/kg/hr)  725 (0.3)     Drains  540     Total Output  1265     Net  -1265                    Medications (drips):       Physical Examination  Telemetry:  Rhythm: normal sinus rhythm (25)      Constitutional:  No acute distress.   Cardiovascular: RRR.    Respiratory: Normal breath sounds  No adventitious sounds   Abdominal:  Soft with no tenderness.   Extremities: No Edema   Neurological:   Alert, Oriented, Cooperative.    Best Eye Response: 4-->(E4) spontaneous (25)  Best Motor Response: 6-->(M6) obeys commands (25)  Best Verbal Response: 5-->(V5) oriented (25)  Parish Coma Scale Score: 15 (25)               Hematology:  Results from last 7 days   Lab Units 25  0344   WBC 10*3/mm3 12.81*   HEMOGLOBIN g/dL 13.3   MCV fL 90.8   PLATELETS 10*3/mm3 382     Results from last 7 days   Lab Units 25  0344   NEUTROS ABS 10*3/mm3 10.72*   LYMPHS ABS 10*3/mm3 0.78   EOS ABS 10*3/mm3 0.06     Chemistry:  Estimated Creatinine Clearance: 128.8 mL/min (A) (by C-G formula based on SCr of 0.7 mg/dL (L)).    Results from last 7 days   Lab Units 25  0344 25  1652 25  0643   SODIUM mmol/L 128*  --  134*   POTASSIUM mmol/L 4.1 4.2 3.9   CHLORIDE mmol/L 97*  --  97*   CO2 mmol/L 21.0*  --  23.0   BUN mg/dL 10  --  9   CREATININE mg/dL 0.70*  --  1.00    GLUCOSE mg/dL 103*  --  89     Results from last 7 days   Lab Units 04/03/25  0344 04/02/25  0643   CALCIUM mg/dL 8.8 9.2     Images:  No radiology results for the last day    Echo:  Results for orders placed during the hospital encounter of 04/02/25    Adult Transthoracic Echo Limited W/ Cont if Necessary Per Protocol    Interpretation Summary    There is a pericardial effusion.    Patient has about 500 cc taken out in the Cath Lab and has decreased pericardial effusion.      Results: Reviewed.  I reviewed the patient's new laboratory and imaging results.  I independently reviewed the patient's new images.    Medications: Reviewed.      Assessment   A/P     Hospital:  LOS: 1 day   ICU: 22h     Active Hospital Problems    Diagnosis  POA    **Pericardial effusion [I31.39]  Yes    Status post pericardiocentesis [Z98.890]  Not Applicable     Angel is a 59 y.o. male admitted on 4/2/2025 with Pericardial effusion [I31.39]  Status post pericardiocentesis [Z98.890]     Assessment/Management/Treatment Plan:    Pericardial effusion  Procedure(s) (LRB):  Pericardiocentesis (N/A)   Dr. Garcias  04/02/25     Cardiovascular  Aortic stenosis, non-rheumatic  HTN  Dyslipidemia   Non smoker.  Sleep Medicine  CHANG  GI/Hepatology  GERD  H/o Non-Hodgkin Lymphoma in the 90s. He saw Dr. Rashi Rossi (Hematology-Oncology)  Endocrine   Body mass index is 30.13 kg/m². Obese Class I: 30-34.9kg/m2  Hypothyroidism    Lab Results   Component Value Date    TSH 1.290 04/02/2025     No history of Diabetes    Diet: Diet: Regular/House, Cardiac; Healthy Heart (2-3 Na+); Fluid Consistency: Thin (IDDSI 0)  Orders Placed This Encounter      DIET MESSAGE Please send lunch tray. Thank you S frankie rn      Advance Directives: Code Status and Medical Interventions: CPR (Attempt to Resuscitate); Full Support   Ordered at: 04/03/25 0801     Code Status (Patient has no pulse and is not breathing):    CPR (Attempt to Resuscitate)     Medical  Interventions (Patient has pulse or is breathing):    Full Support        VTE Prophylaxis:  Mechanical VTE prophylaxis orders are present.    In brief:  Pericardial drain output: 540 mL.  Monitor hyponatremia.  F/U Echo today, and possible discharge as per Cardiology Team.  Disposition: Discharge Home pending ECHO.    Plan of care and goals reviewed during interdisciplinary rounds.  I discussed the patient's findings and my recommendations with patient, family, and nursing staff    MDM:    Problem(s) High due to: Acute or Chronic illness or injury that may poses a threat to life or bodily function  Data: Moderate due to: Review of prior external records from each unique source, Review or results of each unique test, and Ordering of each unique test    Moderate    [] Primary Attending Intensive Care Medicine - Nutrition Support   [x] Consultant    Copied text in this note has been reviewed and is accurate as of 04/03/25

## 2025-04-03 NOTE — CASE MANAGEMENT/SOCIAL WORK
Discharge Planning Assessment  [facility]     Patient Name: Angel Campbell  MRN: [id number]  Today's Date: 4/3/2025    Admit Date: 4/2/2025    Plan: Home   Discharge Needs Assessment       Row Name 04/03/25 1400       Living Environment    People in Home spouse    Current Living Arrangements home    Primary Care Provided by self    Provides Primary Care For no one    Family Caregiver if Needed spouse    Family Caregiver Names Kaycee Campbell [phone]    Quality of Family Relationships supportive    Able to Return to Prior Arrangements yes       Resource/Environmental Concerns    Transportation Concerns none       Transition Planning    Patient/Family Anticipates Transition to home with family    Patient/Family Anticipated Services at Transition none    Transportation Anticipated family or friend will provide       Discharge Needs Assessment    Readmission Within the Last 30 Days no previous admission in last 30 days    Equipment Currently Used at Home oxygen;cpap                   Discharge Plan       Row Name 04/03/25 4207       Plan    Plan Home    Patient/Family in Agreement with Plan yes    Plan Comments Spoke with Mr. Campbell and family at the bedside. Mr. Campbell lives with his Spouse in [place] [facility]. He is independent with ADL's. He uses 3L oxygen and CPAP at home through [facility]. He does not have home health. He does not have advance directives. His preferred pharmacy is [facility] in [place]. His PCP is Talon Leos and he has [employer] insurance. Discharge plan is home. CM will continue to follow for discharge needs.    Final Discharge Disposition Code 01 - home or self-care                       Demographic Summary       Row Name 04/03/25 1358       General Information    Admission Type inpatient    Arrived From home    Referral Source admission list    Reason for Consult discharge planning    Preferred Language English       Contact Information    Permission Granted to Share Info  With                    Functional Status       Row Name 04/03/25 1400       Functional Status, IADL    Medications independent    Meal Preparation independent    Housekeeping independent    Laundry independent    Shopping independent       Mental Status    General Appearance WDL WDL                   Psychosocial    No documentation.                  Abuse/Neglect    No documentation.                  Legal    No documentation.                  Substance Abuse    No documentation.                  Patient Forms    No documentation.                     Keshia Obregon RN

## 2025-04-03 NOTE — PAYOR COMM NOTE
"Angel Campbell (59 y.o. Male)       Date of Birth   1965    Social Security Number       Address   [address] [address]    Home Phone   [phone]    MRN   [id number]       Mandaeism   Religion    Marital Status                               Admission Date   2025    Admission Type   Urgent    Admitting Provider   Belinda Garcias MD    Attending Provider   Belinda Garcias MD    Department, Room/Bed   [facility] 2A ICU, N203/       Discharge Date       Discharge Disposition       Discharge Destination                                 Attending Provider: Belinda Garcias MD    Allergies: Codeine Sulfate    Isolation: None   Infection: None   Code Status: CPR    Ht: 174 cm (68.5\")   Wt: 91.2 kg (201 lb 1 oz)    Admission Cmt: None   Principal Problem: Pericardial effusion [I31.39]                   Active Insurance as of 2025       Primary Coverage       Payor Plan Insurance Group Employer/Plan Group    [employer]UE CROSS [employer] CROSS BLUE SHIELD PPO [id number]       Payor Plan Address Payor Plan Phone Number Payor Plan Fax Number Effective Dates    [address] [phone]  2022 - None Entered    [address] [address]         Subscriber Name Subscriber Birth Date Member ID       ANGEL CAMPBELL 1965 [id number]                     Emergency Contacts        (Rel.) Home Phone Work Phone Mobile Phone    Adrian,April (Spouse) [phone] -- [phone]                 History & Physical        Belinda Garcias MD at [address] [place] [address] Cardiology at [facility] HISTORY AND PHYSICAL    Angel Campbell  : 1965  MRN:[id number]  IDENTIFICATION: A 59 y.o. male     Date of Admission:(Not on file)      CC: Pericardial Effusion, Aortic Stenosis       HPI: Patient is a 59-year-old male with past medical history listed below who was sent for referral from Dr. Márquez for pericardiocentesis.  " Patient has been dealing with pericardial effusion since March 2024 which was incidentally found during a routine echocardiogram at [facility].  Patient was referred for an urgent pericardial window with no consideration for pericardiocentesis but cardiology at [facility] declined.  Patient was then seen by Dr. Hopson in June 2024, however due to lack of symptoms at the time the decision was to leave the usual.  Subsequently for the last several weeks to months the patient has become more dyspneic with exertion.  Patient is unable to sleep flat and is now experiencing chest pain to the point that he feels he has no quality of life.  Family limited in his daily activities.  Of note patient had non-Hodgkin's lymphoma in 1994 which he has been in remission after chemotherapy and radiation.  Patient is under surveillance by his oncologist with yearly chest x-rays and there has been no recurrence of his cancer that he is aware of.      Problem List:   Pericardial Effusion   h/o pericarditis in 2013, idiopathic?  TTE 3/24 @[facility]: EF 55-60%, mild AS, moderate to large free-flowing pericardial effusion, circumferential.  No evidence of hemodynamic compromise.  TTE 1/25: EF 80%, LVH, moderate AS, large pericardial effusion measuring 2.4 cm  TTE 3/26/25: EF 64% large circumferential pericardial effusion with right atrial free wall collapse present. Maximal diameter of 2.3-2.5 cm along the anterolateral free wall of the left ventricle. There is no echocardiographic evidence of cardiac tamponade. IVC is of normal size and not plethoric.   Not a pericardial window candidate per CTS   Nonrheumatic Aortic Stenosis   Mild in March 2024, read as moderate by Dr. Meyer in January 2024 with a peak velocity of 3.6 m/s   TTE 3/26/25: EF 64%, Moderate to severe aortic valve stenosis is present. (Vmax 3.6 m/s. mPG 33 mmHg. ELVA 0.6 cm2. DI 0.19.)   HTN  HLD  CHANG  Hypothyroidism  GERD  Non-Hodgkins Lymphoma 1990  S/P Chemo & Radiation     ROS:    @Cardiovascular ROS: positive for - dyspnea on exertion, orthopnea, and palpitations@    Home Medications:     Prior to Admission medications    Medication Sig Start Date End Date Taking? Authorizing Provider   ALPRAZolam (XANAX) 1 MG tablet Take 1 tablet by mouth Daily As Needed for Anxiety.    Seng Brar MD   cetirizine (ZyrTEC) 10 MG tablet Take 1 tablet by mouth As Needed.    Seng Brar MD   colchicine 0.6 MG tablet Take 1 tablet by mouth 2 (Two) Times a Day. 3/26/25   Darrell Márquez MD   esomeprazole (NexIUM) 20 MG capsule Take 1 capsule by mouth Daily.    Seng Brar MD   levothyroxine (SYNTHROID, LEVOTHROID) 125 MCG tablet TAKE 1 TABLET BY MOUTH ONCE DAILY 9/4/20   Rashi Rossi MD   Tirzepatide-Weight Management (Zepbound) 2.5 MG/0.5ML solution auto-injector Inject 0.5 mL under the skin into the appropriate area as directed 1 (One) Time Per Week.    Seng Brar MD   valsartan (DIOVAN) 160 MG tablet TAKE 2 TABLETS PO QD 6/24/20   Seng Brar MD   vitamin B-12 (CYANOCOBALAMIN) 1000 MCG tablet Take 1 tablet by mouth Daily. 7/23/24   Seng Brar MD   zolpidem (AMBIEN) 10 MG tablet Take 1 tablet by mouth At Night As Needed for Sleep.    Seng Brar MD       Surgical History:   Past Surgical History:   Procedure Laterality Date    SKIN CANCER EXCISION  07/2019    Back- Basal Cell    SKIN CANCER EXCISION  2022    Back of neck       Allergies:   Allergies   Allergen Reactions    Codeine Sulfate Other (See Comments)     Childhood reaction, reported by mother. Patient does not remember reaction        Social History:   Social History     Socioeconomic History    Marital status:    Tobacco Use    Smoking status: Never    Smokeless tobacco: Never   Vaping Use    Vaping status: Never Used   Substance and Sexual Activity    Alcohol use: Yes     Alcohol/week: 2.0 - 3.0 standard drinks of alcohol     Types: 2 - 3 Drinks containing 0.5  oz of alcohol per week     Comment: 2-3 drinks daily    Drug use: No    Sexual activity: Yes     Partners: Female     Birth control/protection: Partner of same sex       Family History:   Family History   Problem Relation Age of Onset    Breast cancer Mother     Cancer Mother         Breast Cancer--2020    Colon cancer Father     Cancer Father         Colorectal cancer--2023       Objective     There were no vitals taken for this visit.  No intake or output data in the 24 hours ending 04/01/25 1439      Physical Exam:  Physical Exam  Cardiovascular:      Rate and Rhythm: Normal rate.      Heart sounds: Normal heart sounds. Murmur heard.   Abdominal:      General: Abdomen is flat.   Musculoskeletal:         General: Normal range of motion.   Skin:     General: Skin is warm.   Neurological:      General: No focal deficit present.      Mental Status: He is alert.           Labs:  Results from last 7 days   Lab Units 03/26/25  1108   SODIUM mmol/L 136   POTASSIUM mmol/L 4.1   CHLORIDE mmol/L 100   CO2 mmol/L 22.8   BUN mg/dL 15   CREATININE mg/dL 1.34*   GLUCOSE mg/dL 95   CALCIUM mg/dL 9.1         Results from last 7 days   Lab Units 03/26/25  1108   WBC 10*3/mm3 10.26   HEMOGLOBIN g/dL 14.7   HEMATOCRIT % 44.0   PLATELETS 10*3/mm3 325             Results from last 7 days   Lab Units 03/26/25  1108   TSH uIU/mL 0.307         Results from last 7 days   Lab Units 03/26/25  1108   PROBNP pg/mL 139.0       Sed Rate 13     HS C-Reactive Protein 9.110, elevated        Imaging/Diagnostics:     EKG: 3/26/25    Performed by: Darrell Márquez MD     Authorized by: Darrell Márquez MD  Comparison: not compared with previous ECG   Previous ECG: no previous ECG available  Rhythm: sinus rhythm  Rate: normal  BPM: 83  Conduction: conduction normal  ST Elevation: all (Mild, diffuse ST elevation)  QRS axis: normal  Clinical impression: abnormal EKG    CHEST X-RAY IMPRESSION: 3/5/25  Large cardiac silhouette. Mild extrinsic compression on  trachea suggested but unchanged from many previous chest x-rays. No other acute cardiopulmonary disease process.     MOST RECENT ECHO IMPRESSION: 3/26/25    Left ventricular systolic function is normal. Calculated left ventricular EF = 64% Left ventricular ejection fraction appears to be 61 - 65%.    Left ventricular wall thickness is consistent with mild concentric hypertrophy. Left ventricular diastolic function was normal.    Normal right ventricular size and function.    Moderate to severe aortic valve stenosis is present. (Vmax 3.6 m/s. mPG 33 mmHg. ELVA 0.6 cm2. DI 0.19.)    There is a moderate to large circumferential pericardial effusion with right atrial free wall  collapse present.  Maximal diameter of 2.3-2.5 cm along the anterolateral free wall of the left ventricle.  There is no echocardiographic evidence of cardiac tamponade.  IVC is of normal size and not plethoric.      Assessment and Plan:     ASSESSMENT:   Pericardial Effusion  Colchicine 1.2 mg loading dose in office 3/26 followed by 0.6 BID for 3 months with ibuprofen PRN for pain      PLAN:  Pericardiocentesis for persistent pericardial effusion   The risks, benefits, and alternatives of the procedure have         been reviewed and the patient wishes to proceed.     ANYA Leroy          Electronically signed by Belinda Garcias MD at 25 0813       Lab Results (last 24 hours)       Procedure Component Value Units Date/Time    NON-[facility]N CYTOLOGY, [facility] (REJI,COR,MAD,CARI) [[id number]] Collected: 25 0945    Specimen: Body Fluid Updated: 25 1010     Reference Lab Report --     Pathology & Cytology [address] [place], [place] [address]Phone: [phone] or [phone]: [phone] Garcia M.D., Medical DirectorPATIENT NAME                                    LABORATORY NO.153   TOMMIE ESTEVEZAna Maria                                  [id number]                                  AGE                   SEX      N            CLIENT REF #[facility]                    59        1965            [id number]    [id number] SANTOS RODRIGEZ                       REQUESTING M.D.         ATTENDING M.D..         [place] [address]                         GER COLBERT COLLECTED          DATE RECEIVED           DATE [id number]/02/2025 04/02/2025 04/03/2025DIAGNOSIS:PERICARDIAL FLUID:  Negative for malignant cells.MICROSCOPIC DESCRIPTION:Scattered reactive mesothelial cells are present. Chronic inflammation is present.Professional   interpretation rendered by Vanita Biggs D.O., ROXIE.C.A.PAna Maria at [facility], [address] [place] [address].CLINICAL HISTORY:Pericardial effusionSPECIMENS SUBMITTED:PERICARDIAL FLUIDGROSS SPECIMEN DESCRIPTION:10ccs of clear orange fluid with scant visible sediment received in fixativeAutomated paraffin embedded cell block has been examined.CYTOTECHNOLOGIST:         RUCHI BLUE ([facility])                       REVIEWED, DIAGNOSED AND ELECTRONICALLYSIGNED BY:Vanita Biggs D.O., F.C.A.P.CPT CODES:  [id number], [id number]    Body Fluid Culture - Body Fluid, Pericardium [[id number]] Collected: 04/02/25 0945    Specimen: Body Fluid from Pericardium Updated: 04/03/25 0923     Body Fluid Culture No growth     Gram Stain Occasional WBCs seen      No organisms seen    Basic Metabolic Panel [[id number]]  (Abnormal) Collected: 04/03/25 0344    Specimen: Blood Updated: 04/03/25 0447     Glucose 103 mg/dL      BUN 10 mg/dL      Creatinine 0.70 mg/dL      Sodium 128 mmol/L      Potassium 4.1 mmol/L      Comment: Slight hemolysis detected by analyzer. Result may be falsely elevated.        Chloride 97 mmol/L      CO2 21.0 mmol/L      Calcium 8.8 mg/dL      BUN/Creatinine Ratio 14.3     Anion Gap 10.0 mmol/L      eGFR 107.5 mL/min/1.73     Narrative:      GFR Categories in Chronic Kidney Disease (CKD)      GFR Category          GFR (mL/min/1.73)     Interpretation  G1                     90 or greater         Normal or high (1)  G2                      60-89                Mild decrease (1)  G3a                   45-59                Mild to moderate decrease  G3b                   30-44                Moderate to severe decrease  G4                    15-29                Severe decrease  G5                    14 or less           Kidney failure          (1)In the absence of evidence of kidney disease, neither GFR category G1 or G2 fulfill the criteria for CKD.    eGFR calculation 2021 CKD-EPI creatinine equation, which does not include race as a factor    CBC & Differential [[id number]]  (Abnormal) Collected: 04/03/25 0344    Specimen: Blood Updated: 04/03/25 0416    Narrative:      The following orders were created for panel order CBC & Differential.  Procedure                               Abnormality         Status                     ---------                               -----------         ------                     CBC Auto Differential[[id number]]        Abnormal            Final result                 Please view results for these tests on the individual orders.    CBC Auto Differential [[id number]]  (Abnormal) Collected: 04/03/25 0344    Specimen: Blood Updated: 04/03/25 0416     WBC 12.81 10*3/mm3      RBC 4.34 10*6/mm3      Hemoglobin 13.3 g/dL      Hematocrit 39.4 %      MCV 90.8 fL      MCH 30.6 pg      MCHC 33.8 g/dL      RDW 12.9 %      RDW-SD 42.9 fl      MPV 8.3 fL      Platelets 382 10*3/mm3      Neutrophil % 83.7 %      Lymphocyte % 6.1 %      Monocyte % 8.5 %      Eosinophil % 0.5 %      Basophil % 0.7 %      Immature Grans % 0.5 %      Neutrophils, Absolute 10.72 10*3/mm3      Lymphocytes, Absolute 0.78 10*3/mm3      Monocytes, Absolute 1.09 10*3/mm3      Eosinophils, Absolute 0.06 10*3/mm3      Basophils, Absolute 0.09 10*3/mm3      Immature Grans, Absolute 0.07 10*3/mm3      nRBC 0.0 /100 WBC     Potassium [[id number]]  (Normal)  Collected: 04/02/25 1652    Specimen: Blood Updated: 04/02/25 1732     Potassium 4.2 mmol/L      Comment: Slight hemolysis detected by analyzer. Result may be falsely elevated.       TSH Rfx On Abnormal To Free T4 [[id number]]  (Normal) Collected: 04/02/25 0643    Specimen: Blood Updated: 04/02/25 1613     TSH 1.290 uIU/mL     Body Fluid Cell Count With Differential - Body Fluid, Pericardium [[id number]]  (Abnormal) Collected: 04/02/25 0937    Specimen: Body Fluid from Pericardium Updated: 04/02/25 1211    Narrative:      The following orders were created for panel order Body Fluid Cell Count With Differential - Body Fluid, Pericardium.  Procedure                               Abnormality         Status                     ---------                               -----------         ------                     Body fluid cell count - ...[[id number]]  Abnormal            Final result               Body fluid differential ...[[id number]]                      Final result                 Please view results for these tests on the individual orders.    Body fluid differential - Body Fluid, Pericardium [[id number]] Collected: 04/02/25 0937    Specimen: Body Fluid from Pericardium Updated: 04/02/25 1211     Neutrophils, Fluid % 9 %      Lymphocytes, Fluid % 38 %      Monocytes, Fluid % 8 %      Eosinophils, Fluid % 4 %      Mesothelial Cells, Fluid % 39 %      Macrophage, Fluid % 2 %     Narrative:      If the reference range(s) are not listed, then the reference range(s) have not been defined, so unavailable for the body fluid result.    Body fluid cell count - Body Fluid, Pericardium [[id number]]  (Abnormal) Collected: 04/02/25 0937    Specimen: Body Fluid from Pericardium Updated: 04/02/25 1211     Color, Fluid Red     Appearance, Fluid Cloudy     WBC, Fluid 526 /mm3      RBC, Fluid 99,000 /mm3     Narrative:      Body Fluid received in preservative other than EDTA. Interpret Cell Count results accordingly.    Body fluid cell  "count performed on clotted specimen. Interpret results accordingly.    If the reference range(s) are not listed, then the reference range(s) have not been defined, so unavailable for the body fluid result.          Imaging Results (Last 24 Hours)       ** No results found for the last 24 hours. **          Operative/Procedure Notes (all)    No notes of this type exist for this encounter.          Physician Progress Notes (last 24 hours)        Belinda Garcias MD at 04/03/25 0801          [facility] at [facility]  IP Progress Note    HOSPITAL COURSE:  Patient has successful drainage of about 1000 cc overnight.  Will get an echo and we will pull the drain      CHIEF COMPLAINTS:  Shortness of breath    Subjective   Patient is feeling much better.      Objective     Blood pressure 94/69, pulse 82, temperature 98.2 °F (36.8 °C), temperature source Oral, resp. rate 16, height 174 cm (68.5\"), weight 91.2 kg (201 lb 1 oz), SpO2 92%.     Intake/Output Summary (Last 24 hours) at 4/3/2025 0801  Last data filed at 4/3/2025 0500  Gross per 24 hour   Intake --   Output 1265 ml   Net -1265 ml       PHYSICAL EXAM:  Constitutional:       General: Not in acute distress.     Appearance: Healthy appearance. Not in distress.     Neck:     JVP:Not elevated     Carotid artery: Normal    Pulmonary:      Effort: Pulmonary effort is normal.      Breath sounds: Normal breath sounds. No wheezing. No rhonchi. No rales.     Cardiovascular:      Normal rate. Regular rhythm. Normal S1. Normal S2.      Murmurs: There is 2/6 systolic murmur.      No gallop. No click. No rub.     Abdominal:      General: Bowel sounds are normal.      Palpations: Abdomen is soft.      Tenderness: There is no abdominal tenderness.    Extremities:     Pulses:Normal radial and pedal pulses     Edema:no edema    MEDICATIONS:    levothyroxine, 125 mcg, Oral, Daily  mupirocin, 1 Application, Each Nare, BID  pantoprazole, 40 mg, Oral, Q AM  sodium " chloride, 10 mL, Intravenous, Q12H          Results from last 7 days   Lab Units 04/03/25  0344   WBC 10*3/mm3 12.81*   HEMOGLOBIN g/dL 13.3   HEMATOCRIT % 39.4   PLATELETS 10*3/mm3 382     Results from last 7 days   Lab Units 04/03/25  0344   SODIUM mmol/L 128*   POTASSIUM mmol/L 4.1   CHLORIDE mmol/L 97*   CO2 mmol/L 21.0*   BUN mg/dL 10   CREATININE mg/dL 0.70*   CALCIUM mg/dL 8.8   GLUCOSE mg/dL 103*         Lab Results   Component Value Date    TROPONINT 10 04/26/2024     Results from last 7 days   Lab Units 04/02/25  0643   TSH uIU/mL 1.290             RESULT REVIEW:    I reviewed the patient's new clinical results.    Tele: Sinus Rythym      ASSESSMENT:     Pericardial effusion status post pericardiocentesis  History of lymphoma status postchemotherapy    PLAN:     Patient has stopped the drainage and he has about 1000 cc out.  His sodium is low today and will be repeated before he get discharged.  He will continue his colchicine as directed by his primary cardiologist.  He will follow-up with him after echo is done.    Electronically signed by Belinda Garcias MD at 04/03/25 0819       Darrell Rodriguez MD at 04/02/25 1040            INTENSIVIST   PROGRESS NOTE     Subjective    SUBJECTIVE     Mr. Angel Campbell, 59 y.o. male is followed for: No chief complaint on file.       S/P Procedure(s) (LRB):  Pericardiocentesis (N/A)   Perioperative medical management of comorbid conditions, including glycemic control and electrolytes disorders.    As an Intensivist, we have completed an accredited Critical Care program and maintain Board Certification and dedicate most of our professional work to critical/intensive care. We serve as the  or member of an interprofessional team, coordinating and guiding healthcare professionals to provide specialized care for critically ill patients. We manage and resuscitate patients with, or at risk for, critical illness and organ failure and initiate interventions to prevent  imminent deterioration. (2024 Consensus Statement from the Society of Critical Care Medicine Defining Intensivist Task Force. [facility] 2025. DOI: 10.1097/[facility].9198179024789353     Interval History:  POD: * Day of Surgery *    He was seen in 2A ICU upon arrival from Cath Lab.    He was admitted on 4/2/2025 for a pericardiocentesis.    Events from surgery were reviewed.    At present, he is doing well.   Comfortable.   No distress.   No chest pain.    Temp  Min: 98 °F (36.7 °C)  Max: 98.1 °F (36.7 °C)     PMH: He  has a past medical history of Allergic rhinitis, Anxiety (30+ years), Asthma (March 2024), GERD (gastroesophageal reflux disease), Heart murmur (30+ years), Heart valve disease (2019), Hiatal hernia (1990), Hodgkin lymphoma, Hodgkin's disease, Hyperlipidemia (2022), Hypertension, Hypothyroidism, and Sleep apnea, obstructive.   PSxH: He  has a past surgical history that includes Skin cancer excision (07/2019) and Skin cancer excision (2022).      Medications:  No current facility-administered medications on file prior to encounter.     Current Outpatient Medications on File Prior to Encounter   Medication Sig    ALPRAZolam (XANAX) 1 MG tablet Take 1 tablet by mouth Daily As Needed for Anxiety.    cetirizine (ZyrTEC) 10 MG tablet Take 1 tablet by mouth As Needed.    colchicine 0.6 MG tablet Take 1 tablet by mouth 2 (Two) Times a Day.    esomeprazole (NexIUM) 20 MG capsule Take 1 capsule by mouth Daily.    levothyroxine (SYNTHROID, LEVOTHROID) 125 MCG tablet TAKE 1 TABLET BY MOUTH ONCE DAILY    Tirzepatide-Weight Management (Zepbound) 2.5 MG/0.5ML solution auto-injector Inject 2 mL under the skin into the appropriate area as directed 1 (One) Time Per Week.    valsartan (DIOVAN) 160 MG tablet TAKE 2 TABLETS PO QD    zolpidem (AMBIEN) 10 MG tablet Take 1 tablet by mouth At Night As Needed for Sleep.    [DISCONTINUED] vitamin B-12 (CYANOCOBALAMIN) 1000 MCG tablet Take 1 tablet by mouth Daily.       Allergies: He is  allergic to codeine sulfate.   FH: His family history includes Breast cancer in his mother; Cancer in his father and mother; Colon cancer in his father.   SH: He  reports that he has never smoked. He has never used smokeless tobacco. He reports current alcohol use of about 2.0 - 3.0 standard drinks of alcohol per week. He reports that he does not use drugs.     The patient's relevant past medical, surgical and social history were reviewed and updated in Epic as appropriate.       History     Last Reviewed by Darrell Rodriguez MD on 2025 at 10:40 AM    Sections Reviewed    Medical, Surgical, Family, Tobacco, Alcohol, Drug Use, Sexual Activity,   Social Documentation    Problem list reviewed by Darrell Rodriguez MD on 2025 at 10:40 AM  Medicines reviewed by Darrell Rodriguez MD on 2025 at 10:40 AM  Allergies reviewed by Darrell Rodriguez MD on 2025 at 10:40 AM       ROS:   As per HPI    Objective    OBJECTIVE     Vitals:  Temp: 98 °F (36.7 °C) (25) Temp  Min: 98 °F (36.7 °C)  Max: 98.1 °F (36.7 °C)   Temp core:      BP: 143/83 (25 0940) BP  Min: 143/83  Max: 164/93   MAP (non-invasive) Noninvasive MAP (mmHg): 116 (25 0640) Noninvasive MAP (mmHg)  Av  Min: 116  Max: 116   Pulse: 83 (25) Pulse  Min: 74  Max: 83   Resp: 17 (25) Resp  Min: 16  Max: 17   SpO2: 95 % (25) SpO2  Min: 93 %  Max: 95 %   Device: room air (25)    Flow Rate: 2 (25 0833) Flow (L/min) (Oxygen Therapy)  Min: 2  Max: 2         25  0639 25   Weight: 90.8 kg (200 lb 3.2 oz) 91.2 kg (201 lb 1 oz)        Intake/Ouptut 24 hrs (7:00AM - 6:59 AM)  Intake & Output (last 2 days)          07 0700  07 07 07 0700    Drains   340    Total Output   340    Net   -340                   Medications (drips):       Physical Examination  Telemetry:  Rhythm: normal sinus rhythm (25 0945)      Constitutional:  No acute  distress.   Cardiovascular: RRR.    Respiratory: Normal breath sounds  No adventitious sounds   Abdominal:  Soft with no tenderness.   Extremities: No Edema   Neurological:   Alert, Oriented, Cooperative.                             Hematology:  Results from last 7 days   Lab Units 03/26/25  1108   WBC 10*3/mm3 10.26   HEMOGLOBIN g/dL 14.7   MCV fL 93.0   PLATELETS 10*3/mm3 325         Chemistry:  Estimated Creatinine Clearance: 90.1 mL/min (by C-G formula based on SCr of 1 mg/dL).    Results from last 7 days   Lab Units 04/02/25  0643 03/26/25  1108   SODIUM mmol/L 134* 136   POTASSIUM mmol/L 3.9 4.1   CHLORIDE mmol/L 97* 100   CO2 mmol/L 23.0 22.8   BUN mg/dL 9 15   CREATININE mg/dL 1.00 1.34*   GLUCOSE mg/dL 89 95     Results from last 7 days   Lab Units 04/02/25  0643 03/26/25  1108   CALCIUM mg/dL 9.2 9.1     Hepatic Panel:  Results from last 7 days   Lab Units 03/26/25  1108   ALBUMIN g/dL 4.2   TOTAL PROTEIN g/dL 7.3   BILIRUBIN mg/dL 1.2   AST (SGOT) U/L 17   ALT (SGPT) U/L 20   ALK PHOS U/L 64        Images:  No radiology results for the last day    Echo:  Results for orders placed during the hospital encounter of 03/26/25    Adult Transthoracic Echo Complete W/ Cont if Necessary Per Protocol    Interpretation Summary    Left ventricular systolic function is normal. Calculated left ventricular EF = 64% Left ventricular ejection fraction appears to be 61 - 65%.    Left ventricular wall thickness is consistent with mild concentric hypertrophy. Left ventricular diastolic function was normal.    Normal right ventricular size and function.    Moderate to severe aortic valve stenosis is present. (Vmax 3.6 m/s. mPG 33 mmHg. ELVA 0.6 cm2. DI 0.19.)    There is a moderate to large circumferential pericardial effusion with right atrial free wall  collapse present.  Maximal diameter of 2.3-2.5 cm along the anterolateral free wall of the left ventricle.  There is no echocardiographic evidence of cardiac tamponade.  IVC  is of normal size and not plethoric.      Results: Reviewed.  I reviewed the patient's new laboratory and imaging results.  I independently reviewed the patient's new images.    Medications: Reviewed.      Assessment   A/P     Hospital:  LOS: 0 days   ICU: 35m     Active Hospital Problems    Diagnosis  POA    **Pericardial effusion [I31.39]  Yes    Status post pericardiocentesis [Z98.890]  Not Applicable     Angel is a 59 y.o. male admitted on 4/2/2025 with Pericardial effusion [I31.39]  Status post pericardiocentesis [Z98.890]     Assessment/Management/Treatment Plan:    Pericardial effusion  Procedure(s) (LRB):  Pericardiocentesis (N/A)   Dr. Garcias  04/02/25     Cardiovascular  Aortic stenosis, non-rheumatic  HTN  Dyslipidemia   Non smoker.  Sleep Medicine  [facility]  GI/Hepatology  GERD  Endocrine   Body mass index is 30.13 kg/m². Obese Class I: 30-34.9kg/m2  Hypothyroidism  No history of Diabetes    Diet: NPO Diet NPO Type: Strict NPO  No active supplement orders      Advance Directives: There are no questions and answers to display.        VTE Prophylaxis:  Mechanical VTE prophylaxis orders are present.    In brief:  ICU post operative medical management.  Hemodynamic management. Adequate preload.  Urinary Output > = 0.5 mL/kg/hr  Watch for arrhythmias.  Watch for bleeding.  Insulin Sub Q as clinically indicated. Avoid oral antidiabetic medications. Target glucose < = 180 mg/dL.   Disposition: Admit to ICU    Plan of care and goals reviewed during interdisciplinary rounds.  I discussed the patient's findings and my recommendations with patient, family, and nursing staff    MDM:    Problem(s) High due to: Acute or Chronic illness or injury that may poses a threat to life or bodily function  Data: Moderate due to: Review of prior external records from each unique source, Review or results of each unique test, and Ordering of each unique test    Moderate    [] Primary Attending Intensive Care Medicine - Nutrition Support    [x] Consultant    Copied text in this note has been reviewed and is accurate as of 04/02/25     Electronically signed by Darrell Rodriguez MD at 04/02/25 1330       Consult Notes (last 24 hours)  Notes from 04/02/25 1026 through 04/03/25 1026   No notes of this type exist for this encounter.

## 2025-04-03 NOTE — PLAN OF CARE
Goal Outcome Evaluation:            VSS.  2-3LNC overnight.  Afebrile.  Adequate UOP, 0 BM this shift.  PRN tylenol given for pain/discomfort x2.  50mL drain output.  Pt awake and sitting up in bed eating breakfast this am.  No acute events overnight, will continue to monitor.

## 2025-04-04 ENCOUNTER — TELEPHONE (OUTPATIENT)
Dept: CARDIOLOGY | Facility: CLINIC | Age: 60
End: 2025-04-04
Payer: COMMERCIAL

## 2025-04-04 NOTE — TELEPHONE ENCOUNTER
Called patient to check on him after surgery. States he is having some pain, but overall doing great.

## 2025-04-05 LAB
BACTERIA FLD CULT: NORMAL
GRAM STN SPEC: NORMAL
GRAM STN SPEC: NORMAL

## 2025-04-05 NOTE — DISCHARGE SUMMARY
"  Date of Discharge:  4/5/2025    Discharge Diagnosis:   Active Hospital Problems    Diagnosis  POA    **Pericardial effusion [I31.39]  Yes    Status post pericardiocentesis [Z98.890]  Not Applicable      Resolved Hospital Problems   No resolved problems to display.       Presenting Problem/History of Present Illness  Pericardial effusion [I31.39]  Status post pericardiocentesis [Z98.890]      Hospital Course  Patient is a 59 y.o. male presented with early signs of tamponade.  Patient underwent successful pericardiocentesis and being discharged today in stable condition..      Procedures Performed  Procedure(s):  Pericardiocentesis       Consults:   Consults       No orders found from 3/4/2025 to 4/3/2025.            Pertinent Test Results:     Lab Results   Component Value Date    GLUCOSE 103 (H) 04/03/2025    BUN 10 04/03/2025    CREATININE 0.70 (L) 04/03/2025    EGFRIFNONA 65 09/08/2021    BCR 14.3 04/03/2025    K 4.1 04/03/2025    CO2 21.0 (L) 04/03/2025    CALCIUM 8.8 04/03/2025    ALBUMIN 4.2 03/26/2025    AST 17 03/26/2025    ALT 20 03/26/2025     Lab Results   Component Value Date    WBC 12.81 (H) 04/03/2025    HGB 13.3 04/03/2025    HCT 39.4 04/03/2025    MCV 90.8 04/03/2025     04/03/2025     No results found for: \"CHOL\", \"CHLPL\", \"TRIG\", \"HDL\", \"LDL\", \"LDLDIRECT\"  Lab Results   Component Value Date    TSH 1.290 04/02/2025     No results found for: \"HGBA1C\"      Ejection Fraction: 55%      Condition on Discharge: Stable    Physical Exam at Discharge:   Vital Signs:   Vitals:    04/03/25 1300 04/03/25 1330 04/03/25 1400 04/03/25 1430   BP: 141/85 (!) 161/101 112/83 123/77   BP Location:   Left arm    Patient Position:   Lying    Pulse: 101 105 100 96   Resp:   18    Temp:       TempSrc:       SpO2: 91% 97% 92% 94%   Weight:       Height:         Body mass index is 30.13 kg/m².   Constitutional:       General: Not in acute distress.     Appearance: Healthy appearance. Not in distress.   Pulmonary:     "  Effort: Pulmonary effort is normal.      Breath sounds: Normal breath sounds. No wheezing. No rhonchi. No rales.   Cardiovascular:      Normal rate. Regular rhythm. Normal S1. Normal S2.      Murmurs: There is no murmur.      No gallop. No click. No rub.   Abdominal:      General: Bowel sounds are normal.      Palpations: Abdomen is soft.      Tenderness: There is no abdominal tenderness.  Extremities:     Pulses     Intact distal pulses.     No Edema       Discharge Disposition: Home    Patient still has some pericardial effusion on echo.  Patient will follow-up with primary cardiologist.  He most likely will need a window.      Discharge Medications     Discharge Medications        Changes to Medications        Instructions Start Date   ALPRAZolam 1 MG tablet  Commonly known as: XANAX  What changed: when to take this   1 mg, Oral, Daily PRN             Continue These Medications        Instructions Start Date   cetirizine 10 MG tablet  Commonly known as: zyrTEC   10 mg, As Needed      colchicine 0.6 MG tablet   0.6 mg, Oral, 2 Times Daily      esomeprazole 20 MG capsule  Commonly known as: nexIUM   20 mg, Daily      levothyroxine 125 MCG tablet  Commonly known as: SYNTHROID, LEVOTHROID   TAKE 1 TABLET BY MOUTH ONCE DAILY      valsartan 160 MG tablet  Commonly known as: DIOVAN   TAKE 2 TABLETS PO QD      Zepbound 2.5 MG/0.5ML solution auto-injector  Generic drug: Tirzepatide-Weight Management   10 mg, Weekly      zolpidem 10 MG tablet  Commonly known as: AMBIEN   10 mg, Nightly PRN               Discharge Diet: Cardiac    Activity at Discharge: As tolerated    Follow-up Appointments  Future Appointments   Date Time Provider Department Center   5/7/2025 11:45 AM Darrell Márquez MD [facility]   5/12/2025  1:00 PM Teresa Weiss APRN [facility]   6/25/2025 12:15 PM Carlos Manuel Wood MD [facility]         Test Results Pending at Discharge: Results of pericardial effusion       Belinda Garcias  MD  04/05/25  11:19 EDT    Time: Discharge greater than 30 min

## 2025-04-06 LAB
BH CV ECHO MEAS - EDV(CUBED): 59.3 ML
BH CV ECHO MEAS - ESV(CUBED): 32.8 ML
BH CV ECHO MEAS - FS: 17.9 %
BH CV ECHO MEAS - IVS/LVPW: 0.83 CM
BH CV ECHO MEAS - IVSD: 1 CM
BH CV ECHO MEAS - LA DIMENSION: 3.7 CM
BH CV ECHO MEAS - LV MASS(C)D: 140.1 GRAMS
BH CV ECHO MEAS - LVIDD: 3.9 CM
BH CV ECHO MEAS - LVIDS: 3.2 CM
BH CV ECHO MEAS - LVPWD: 1.2 CM

## 2025-04-09 LAB
MYCOBACTERIUM SPEC CULT: NORMAL
NIGHT BLUE STAIN TISS: NORMAL

## 2025-04-11 NOTE — TELEPHONE ENCOUNTER
Spoke with patient regarding test results, patient verbalizes understanding. Confirmed 5/7 appt with Dr. Márquez. Patient also states he is still feeling much better after procedure.

## 2025-04-16 LAB
MYCOBACTERIUM SPEC CULT: NORMAL
NIGHT BLUE STAIN TISS: NORMAL

## 2025-04-23 ENCOUNTER — TELEPHONE (OUTPATIENT)
Dept: CARDIOLOGY | Facility: CLINIC | Age: 60
End: 2025-04-23
Payer: COMMERCIAL

## 2025-04-23 DIAGNOSIS — I35.0 NONRHEUMATIC AORTIC VALVE STENOSIS: Primary | ICD-10-CM

## 2025-04-23 DIAGNOSIS — I31.39 PERICARDIAL EFFUSION: ICD-10-CM

## 2025-04-23 LAB
MYCOBACTERIUM SPEC CULT: NORMAL
NIGHT BLUE STAIN TISS: NORMAL

## 2025-04-23 NOTE — TELEPHONE ENCOUNTER
"Received voicemail from pt. In message, pt states he wants to let Dr. Mccracken know about some symptoms he has been having since Sunday. He stated that he has \"just not been feeling well.\" He said he has not had any energy. It is taking until around noon before he can really get up and start doing things. He states he is sleeping a lot, has a dry, unproductive cough, and has developed left shoulder pain, that feels like when he had pericarditis. He states he is only taking Colchicine and Motrin for the shoulder pain.     He wanted to let Dr. Márquez know and see if he wanted to do anything before pt appt on 5/7/25.      "

## 2025-04-24 NOTE — TELEPHONE ENCOUNTER
Spoke to pt and informed him what Dr. Márquez said. I told him that the  would contact him about scheduling the echo. He verbalized understanding.

## 2025-04-28 ENCOUNTER — HOSPITAL ENCOUNTER (OUTPATIENT)
Dept: CARDIOLOGY | Facility: HOSPITAL | Age: 60
Discharge: HOME | End: 2025-04-28
Admitting: INTERNAL MEDICINE
Payer: COMMERCIAL

## 2025-04-28 VITALS
WEIGHT: 201.06 LBS | SYSTOLIC BLOOD PRESSURE: 153 MMHG | DIASTOLIC BLOOD PRESSURE: 91 MMHG | BODY MASS INDEX: 29.78 KG/M2 | HEIGHT: 69 IN

## 2025-04-28 DIAGNOSIS — I31.39 PERICARDIAL EFFUSION: ICD-10-CM

## 2025-04-28 DIAGNOSIS — I35.0 NONRHEUMATIC AORTIC VALVE STENOSIS: ICD-10-CM

## 2025-04-28 LAB
AORTIC DIMENSIONLESS INDEX: 0.26 (DI)
AV MEAN PRESS GRAD SYS DOP V1V2: 30 MMHG
AV VMAX SYS DOP: 346.5 CM/SEC
BH CV ECHO MEAS - AO MAX PG: 48 MMHG
BH CV ECHO MEAS - AO V2 VTI: 74.8 CM
BH CV ECHO MEAS - AVA(I,D): 0.81 CM2
BH CV ECHO MEAS - EDV(CUBED): 117.6 ML
BH CV ECHO MEAS - EDV(MOD-SP2): 108 ML
BH CV ECHO MEAS - EDV(MOD-SP4): 117 ML
BH CV ECHO MEAS - EF(MOD-SP2): 57.1 %
BH CV ECHO MEAS - EF(MOD-SP4): 54.4 %
BH CV ECHO MEAS - ESV(CUBED): 59.3 ML
BH CV ECHO MEAS - ESV(MOD-SP2): 46.3 ML
BH CV ECHO MEAS - ESV(MOD-SP4): 53.3 ML
BH CV ECHO MEAS - FS: 20.4 %
BH CV ECHO MEAS - IVS/LVPW: 1 CM
BH CV ECHO MEAS - IVSD: 1.1 CM
BH CV ECHO MEAS - LA DIMENSION: 4.3 CM
BH CV ECHO MEAS - LV DIASTOLIC VOL/BSA (35-75): 57.1 CM2
BH CV ECHO MEAS - LV MASS(C)D: 200.5 GRAMS
BH CV ECHO MEAS - LV MAX PG: 3.3 MMHG
BH CV ECHO MEAS - LV MEAN PG: 2 MMHG
BH CV ECHO MEAS - LV SYSTOLIC VOL/BSA (12-30): 26 CM2
BH CV ECHO MEAS - LV V1 MAX: 90.3 CM/SEC
BH CV ECHO MEAS - LV V1 VTI: 19.3 CM
BH CV ECHO MEAS - LVIDD: 4.9 CM
BH CV ECHO MEAS - LVIDS: 3.9 CM
BH CV ECHO MEAS - LVOT AREA: 3.1 CM2
BH CV ECHO MEAS - LVOT DIAM: 2 CM
BH CV ECHO MEAS - LVPWD: 1.1 CM
BH CV ECHO MEAS - RAP SYSTOLE: 3 MMHG
BH CV ECHO MEAS - SV(LVOT): 60.6 ML
BH CV ECHO MEAS - SV(MOD-SP2): 61.7 ML
BH CV ECHO MEAS - SV(MOD-SP4): 63.7 ML
BH CV ECHO MEAS - SVI(LVOT): 29.6 ML/M2
BH CV ECHO MEAS - SVI(MOD-SP2): 30.1 ML/M2
BH CV ECHO MEAS - SVI(MOD-SP4): 31.1 ML/M2
LV EF BIPLANE MOD: 56.4 %

## 2025-04-28 PROCEDURE — 93308 TTE F-UP OR LMTD: CPT | Performed by: INTERNAL MEDICINE

## 2025-04-28 PROCEDURE — 93325 DOPPLER ECHO COLOR FLOW MAPG: CPT

## 2025-04-28 PROCEDURE — 93321 DOPPLER ECHO F-UP/LMTD STD: CPT | Performed by: INTERNAL MEDICINE

## 2025-04-28 PROCEDURE — 93308 TTE F-UP OR LMTD: CPT

## 2025-04-28 PROCEDURE — 93321 DOPPLER ECHO F-UP/LMTD STD: CPT

## 2025-04-28 PROCEDURE — 93325 DOPPLER ECHO COLOR FLOW MAPG: CPT | Performed by: INTERNAL MEDICINE

## 2025-04-30 ENCOUNTER — RESULTS FOLLOW-UP (OUTPATIENT)
Dept: CARDIOLOGY | Facility: CLINIC | Age: 60
End: 2025-04-30
Payer: COMMERCIAL

## 2025-04-30 LAB
MYCOBACTERIUM SPEC CULT: NORMAL
NIGHT BLUE STAIN TISS: NORMAL

## 2025-04-30 NOTE — TELEPHONE ENCOUNTER
Spoke to pt and informed him what Dr. Márquez said about results. Pt verbalized understanding.     Regarding Ibuprofen, pt states he is currently not taking any. He said he has not taken any for about 2 weeks now. When he was taking it, he was taking about 800mg a day. Right now, he is only taking the Colchicine.

## 2025-05-01 NOTE — TELEPHONE ENCOUNTER
Spoke to pt and informed him what Dr. Márquez said. He verbalized understanding and had no further questions.

## 2025-05-07 ENCOUNTER — OFFICE VISIT (OUTPATIENT)
Dept: CARDIOLOGY | Facility: CLINIC | Age: 60
End: 2025-05-07
Payer: COMMERCIAL

## 2025-05-07 VITALS
OXYGEN SATURATION: 96 % | HEIGHT: 69 IN | DIASTOLIC BLOOD PRESSURE: 70 MMHG | SYSTOLIC BLOOD PRESSURE: 130 MMHG | BODY MASS INDEX: 27.43 KG/M2 | HEART RATE: 81 BPM | WEIGHT: 185.2 LBS

## 2025-05-07 DIAGNOSIS — I10 PRIMARY HYPERTENSION: Chronic | ICD-10-CM

## 2025-05-07 DIAGNOSIS — I30.0 ACUTE IDIOPATHIC PERICARDITIS: ICD-10-CM

## 2025-05-07 DIAGNOSIS — I31.39 PERICARDIAL EFFUSION: Primary | ICD-10-CM

## 2025-05-07 DIAGNOSIS — I35.0 NONRHEUMATIC AORTIC VALVE STENOSIS: Chronic | ICD-10-CM

## 2025-05-07 DIAGNOSIS — Z98.890 STATUS POST PERICARDIOCENTESIS: ICD-10-CM

## 2025-05-07 LAB
MYCOBACTERIUM SPEC CULT: NORMAL
NIGHT BLUE STAIN TISS: NORMAL

## 2025-05-07 PROCEDURE — 99214 OFFICE O/P EST MOD 30 MIN: CPT | Performed by: INTERNAL MEDICINE

## 2025-05-07 RX ORDER — VALSARTAN 320 MG/1
320 TABLET ORAL DAILY
Qty: 30 TABLET | Refills: 3 | Status: SHIPPED | OUTPATIENT
Start: 2025-05-07

## 2025-05-07 RX ORDER — SPIRONOLACTONE 25 MG/1
25 TABLET ORAL DAILY
Qty: 90 TABLET | Refills: 3 | Status: SHIPPED | OUTPATIENT
Start: 2025-05-07

## 2025-05-07 NOTE — PROGRESS NOTES
[facility] Cardiology Office Follow Up Note    Angel Campbell  [id number]  2025    Primary Care Provider: Talon Leos DO    Chief Complaint   Patient presents with    Follow-up     6 week       Subjective     History of Present Illness:   Angel Campbell is a 59 y.o. male with history of idiopathic pericarditis who presents to the Cardiology Clinic for follow up after pericardiocentesis.  We ordered an echo at his last visit and found that he had an effusion around 2.5 cm so we got him in for pericardiocentesis with Dr. Garcias.  About 500 mL of blood colored fluid was removed from the space.  Cytology was negative.  Patient did take some ibuprofen intermittently after that but stopped when his symptoms improved.  He did develop what sounds like pneumonia and was treated with antibiotics and steroids by primary care and that seems to have resolved as well.  He is feeling much better compared to his last visit.  Still says he gets significant shortness of breath with activity but nothing like he had before, but still worse compared to 6 months ago.  Gets a little short of breath when he lies down flat on his back but no PND or leg swelling.  Denies any exertional chest pain.  Does not check his blood pressure at home.    Past Cardiac History and Testin. Pericardial effusion  -- h/o pericarditis in , idiopathic?  --TTE 3/24 @[facility]: EF 55-60%, mild AS, moderate to large free-flowing pericardial effusion, circumferential.  No evidence of hemodynamic compromise.  -- TTE : EF 80%, LVH, moderate AS, large pericardial effusion measuring 2.4 cm  -- Not a pericardial window candidate per CTS   -- Echo 3/ 2025: EF 64%.  Mild LVH.  Mild to severe AS.  Moderate to large circumferential pericardial effusion, max diameter 2.5 cm along anterolateral wall  -- s/p pericardiocentesis with Dr. Garcias on 4/3/2025  -- Echo 2025: EF 56%.  Moderate to severe AS, mild MR.  Less than 1 cm  circumferential pericardial effusion with larger pocket along the left ventricle measuring 1.3 cm    2.  Aortic stenosis  -- Mild in March 2024, read as moderate by Dr. Meyer in January 202 with a peak velocity of 3.6 m/s  -- April 2025: Moderate to severe with ELVA 0.8 cm², V-max 3.5 m/s, mean PG 30    3. H/o Hodgkins lymphoma in 1990, s/p chemo+radiation to the neck area    4. HTN  5.  Recurrent idiopathic pericarditis    Review of Systems:  Review of Systems   Constitutional: Negative.    Respiratory:  Positive for shortness of breath. Negative for cough and chest tightness.    Cardiovascular: Negative.    Gastrointestinal: Negative.    Musculoskeletal: Negative.    Neurological: Negative.        I have reviewed and/or updated the patient's past medical, past surgical, family, social history, problem list and allergies as appropriate.       Current Outpatient Medications:     ALPRAZolam (XANAX) 1 MG tablet, Take 1 tablet by mouth Daily As Needed for Anxiety., Disp: , Rfl:     cetirizine (ZyrTEC) 10 MG tablet, Take 1 tablet by mouth As Needed., Disp: , Rfl:     colchicine 0.6 MG tablet, Take 1 tablet by mouth 2 (Two) Times a Day., Disp: 30 tablet, Rfl: 3    esomeprazole (NexIUM) 20 MG capsule, Take 1 capsule by mouth Daily., Disp: , Rfl:     levothyroxine (SYNTHROID, LEVOTHROID) 125 MCG tablet, TAKE 1 TABLET BY MOUTH ONCE DAILY, Disp: 30 tablet, Rfl: 11    Tirzepatide-Weight Management (Zepbound) 2.5 MG/0.5ML solution auto-injector, Inject 2 mL under the skin into the appropriate area as directed 1 (One) Time Per Week., Disp: , Rfl:     valsartan (DIOVAN) 320 MG tablet, Take 1 tablet by mouth Daily., Disp: 30 tablet, Rfl: 3    zolpidem (AMBIEN) 10 MG tablet, Take 1 tablet by mouth At Night As Needed for Sleep., Disp: , Rfl:     spironolactone (ALDACTONE) 25 MG tablet, Take 1 tablet by mouth Daily., Disp: 90 tablet, Rfl: 3       Objective     Vitals:  Vitals:    05/07/25 1148   BP: 130/70   BP Location: Left arm  "  Patient Position: Sitting   Cuff Size: Adult   Pulse: 81   SpO2: 96%   Weight: 84 kg (185 lb 3.2 oz)   Height: 174 cm (68.5\")       Physical Exam:  Vitals reviewed.   Constitutional:       Appearance: Healthy appearance. Not in distress.   Neck:      Vascular: No JVD.   Pulmonary:      Effort: Pulmonary effort is normal.      Breath sounds: Normal breath sounds.   Cardiovascular:      Normal rate. Regular rhythm. Normal S1. Normal S2.       Murmurs: There is a grade 3/6 harsh midsystolic murmur at the URSB, radiating to the neck.      No gallop.  No click. No rub.   Pulses:     Intact distal pulses.   Edema:     Peripheral edema absent.   Abdominal:      General: There is no distension.      Palpations: Abdomen is soft.      Tenderness: There is no abdominal tenderness.   Skin:     General: Skin is warm and dry.         Results Review:   I reviewed the patient's new clinical results.  I reviewed the patient's new imaging results and agree with the interpretation.  I reviewed the patient's other test results and agree with the interpretation    Procedures    Most Recent Labs:  Reviewed and scanned into chart          Assessment & Plan  Pericardial effusion  Acute idiopathic pericarditis  Status post pericardiocentesis  Status post pericardiocentesis.  500 mL of fluid removed.  Cytology negative  Patient is feeling better.  Still has residual fluid pocket but this is not accessible for drainage and, after discussion with Dr. Garcias, pericardial window likely unnecessary at this point.  CRP mildly up per outside labs.  Since this is a recurrent pericarditis, I think that continuing colchicine for 6 months instead of 3 is reasonable.  No indication for steroids or Arcalyst at this time  NSAIDs only as needed but symptoms have significantly improved           Nonrheumatic aortic valve stenosis  Valve stenosis appears to be moderate to severe on echo.  Only severe by valve area but transvalvular Dopplers are more " moderate.  Murmur on exam is not consistent with severe AS  We will plan to repeat an echo in 6 months (October 2025) and reassess once pericarditis has been treated.       Primary hypertension  Blood pressures are borderline today.  Patient does report feeling a bit of orthopnea but has no signs of heart failure on exam.  Does report a desaturation after getting up in the morning and taking a shower.  Says his oxygen saturation drops to 79 per his home reading.  He is going to see pulmonology for this.  His current exam and previous exams are not consistent with heart failure.  His BNPs has been within normal limits  Possible he may have some diastolic dysfunction but I doubt that this desaturation issue is of cardiac origin  Recommended evaluation by pulmonology  Will add spironolactone to optimize blood pressure control and diuretic may help decrease intracardiac pressure slightly.  BMP in 2 weeks.  Continue valsartan.  If becomes hypotensive, will call and cut valsartan  Continue nightly compliance with CPAP    Orders:    valsartan (DIOVAN) 320 MG tablet; Take 1 tablet by mouth Daily.    spironolactone (ALDACTONE) 25 MG tablet; Take 1 tablet by mouth Daily.    Basic Metabolic Panel; Future           Plan   Preventative Cardiology:   Tobacco Cessation: N/A  Obstructive Sleep Apnea Screening: Completed           Follow Up:   Return in about 2 months (around 7/7/2025).    Thank you for allowing me to participate in the care of your patient. Please do not hesitate to contact me with additional questions or concerns.     Electronically signed by Darrell Márquez MD, 05/07/25, 12:58 PM EDT.

## 2025-05-07 NOTE — ASSESSMENT & PLAN NOTE
Blood pressures are borderline today.  Patient does report feeling a bit of orthopnea but has no signs of heart failure on exam.  Does report a desaturation after getting up in the morning and taking a shower.  Says his oxygen saturation drops to 79 per his home reading.  He is going to see pulmonology for this.  His current exam and previous exams are not consistent with heart failure.  His BNPs has been within normal limits  Possible he may have some diastolic dysfunction but I doubt that this desaturation issue is of cardiac origin  Recommended evaluation by pulmonology  Will add spironolactone to optimize blood pressure control and diuretic may help decrease intracardiac pressure slightly.  BMP in 2 weeks.  Continue valsartan.  If becomes hypotensive, will call and cut valsartan  Continue nightly compliance with CPAP    Orders:    valsartan (DIOVAN) 320 MG tablet; Take 1 tablet by mouth Daily.    spironolactone (ALDACTONE) 25 MG tablet; Take 1 tablet by mouth Daily.    Basic Metabolic Panel; Future

## 2025-05-07 NOTE — ASSESSMENT & PLAN NOTE
Valve stenosis appears to be moderate to severe on echo.  Only severe by valve area but transvalvular Dopplers are more moderate.  Murmur on exam is not consistent with severe AS  We will plan to repeat an echo in 6 months (October 2025) and reassess once pericarditis has been treated.

## 2025-05-07 NOTE — ASSESSMENT & PLAN NOTE
Status post pericardiocentesis.  500 mL of fluid removed.  Cytology negative  Patient is feeling better.  Still has residual fluid pocket but this is not accessible for drainage and, after discussion with Dr. Garcias, pericardial window likely unnecessary at this point.  CRP mildly up per outside labs.  Since this is a recurrent pericarditis, I think that continuing colchicine for 6 months instead of 3 is reasonable.  No indication for steroids or Arcalyst at this time  NSAIDs only as needed but symptoms have significantly improved

## 2025-05-12 ENCOUNTER — TELEMEDICINE (OUTPATIENT)
Age: 60
End: 2025-05-12
Payer: COMMERCIAL

## 2025-05-12 DIAGNOSIS — Z98.890 STATUS POST PERICARDIOCENTESIS: ICD-10-CM

## 2025-05-12 DIAGNOSIS — I35.0 NONRHEUMATIC AORTIC VALVE STENOSIS: ICD-10-CM

## 2025-05-12 DIAGNOSIS — Z85.71 HISTORY OF HODGKIN'S DISEASE: Primary | ICD-10-CM

## 2025-05-12 DIAGNOSIS — I31.39 PERICARDIAL EFFUSION: ICD-10-CM

## 2025-05-12 PROCEDURE — 99214 OFFICE O/P EST MOD 30 MIN: CPT | Performed by: NURSE PRACTITIONER

## 2025-05-12 NOTE — PROGRESS NOTES
PROBLEM LIST:    1.  History of Hodgkin lymphoma diagnosed in 1989-treated with systemic  Chemotherapy and radiation  2.  Chronic hypothyroidism.   3.  Gastroesophageal reflux disease.   4.  Chronic sleep disturbance.   5.  Extrinsic allergies  6.  Recurrent epididymitis  7.  History of craniotomy for a colloid cyst  8.  Pericardial effusion    You have chosen to receive care through a telehealth visit.  Do you consent to use a video/audio connection for your medical care today? Yes    Patient is at home in [place] and I am in our office in [place] for video visit      REASON FOR VISIT: Pericardial effusion    HISTORY OF PRESENT ILLNESS:   59 y.o.  male presents today for follow-up with video visit.  Last seen 10/21/24.  Since last office visit, he has undergone pericardiocentesis with Dr. Garcias 4/2/25 for persistent pericardial effusion and worsening SOA.  500 ml removed with negative cytology.  He has followed up with Dr. Márquez on 5/7/25 with repeat TTE.  Residual fluid pocket not accessible for drainage and pericardial window is not likely necessary at this time.  Continued on colchicine.  AS severe by valve area 0.8 cm2, but transvalvular dopplers are more moderate.  Since pericardiocentesis, he has not had improvement in his SOA.  Pt continues to report desaturations with several activities such as showering into the upper 70's.  Has not been able to return to work.  He reports that on Easter he developed a cough, SOA in which he had a virus or PNA.  Remained on the couch for approx 1 week.  Was not seen by a provider, but did have an antibiotic/steroids called in by PCP in which he is improved.  Due to not eating that week, he has lost 15 lbs.  Also been on zepbound for weight loss which he has held this last week.  Denies any Chest pain.  Plans to see Dr. Wood, pulmonary 6/25/25.  No recent labs.  Denies any lymphadenopathy.      Past medical history, social history and family history was reviewed  and unchanged from prior visit.    Review of Systems:    Review of Systems   Constitutional: Negative.    HENT:  Negative.     Eyes: Negative.    Respiratory:  Positive for shortness of breath.    Cardiovascular: Negative.    Gastrointestinal: Negative.    Endocrine: Negative.    Genitourinary: Negative.     Musculoskeletal: Negative.    Skin: Negative.    Neurological: Negative.    Hematological: Negative.    Psychiatric/Behavioral: Negative.        A comprehensive 14 point review of systems was performed and was negative except as mentioned.      Medications:        Current Outpatient Medications:     ALPRAZolam (XANAX) 1 MG tablet, Take 1 tablet by mouth Daily As Needed for Anxiety., Disp: , Rfl:     cetirizine (ZyrTEC) 10 MG tablet, Take 1 tablet by mouth As Needed., Disp: , Rfl:     colchicine 0.6 MG tablet, Take 1 tablet by mouth 2 (Two) Times a Day., Disp: 30 tablet, Rfl: 3    esomeprazole (NexIUM) 20 MG capsule, Take 1 capsule by mouth Daily., Disp: , Rfl:     levothyroxine (SYNTHROID, LEVOTHROID) 125 MCG tablet, TAKE 1 TABLET BY MOUTH ONCE DAILY, Disp: 30 tablet, Rfl: 11    spironolactone (ALDACTONE) 25 MG tablet, Take 1 tablet by mouth Daily., Disp: 90 tablet, Rfl: 3    Tirzepatide-Weight Management (Zepbound) 2.5 MG/0.5ML solution auto-injector, Inject 2 mL under the skin into the appropriate area as directed 1 (One) Time Per Week., Disp: , Rfl:     valsartan (DIOVAN) 320 MG tablet, Take 1 tablet by mouth Daily., Disp: 30 tablet, Rfl: 3    zolpidem (AMBIEN) 10 MG tablet, Take 1 tablet by mouth At Night As Needed for Sleep., Disp: , Rfl:       ALLERGIES:    Allergies   Allergen Reactions    Codeine Sulfate Other (See Comments)     Childhood reaction, reported by mother. Patient does not remember reaction          Physical Exam    VITAL SIGNS:  There were no vitals taken for this visit.    Wt Readings from Last 3 Encounters:   05/07/25 84 kg (185 lb 3.2 oz)   04/28/25 91.2 kg (201 lb 1 oz)   04/02/25 91.2  kg (201 lb 1 oz)       There is no height or weight on file to calculate BMI. There is no height or weight on file to calculate BSA.         Performance Status: 0    General: well appearing, in no acute distress  Lungs: unlabored  Psych: Mood is stable        RECENT LABS:    Lab Results   Component Value Date    HGB 13.3 04/03/2025    HCT 39.4 04/03/2025    MCV 90.8 04/03/2025     04/03/2025    WBC 12.81 (H) 04/03/2025    NEUTROABS 10.72 (H) 04/03/2025    LYMPHSABS 0.78 04/03/2025    MONOSABS 1.09 (H) 04/03/2025    EOSABS 0.06 04/03/2025    BASOSABS 0.09 04/03/2025       Lab Results   Component Value Date    GLUCOSE 103 (H) 04/03/2025    BUN 10 04/03/2025    CREATININE 0.70 (L) 04/03/2025     (L) 04/03/2025    K 4.1 04/03/2025    CL 97 (L) 04/03/2025    CO2 21.0 (L) 04/03/2025    CALCIUM 8.8 04/03/2025    PROTEINTOT 7.3 03/26/2025    ALBUMIN 4.2 03/26/2025    BILITOT 1.2 03/26/2025    ALKPHOS 64 03/26/2025    AST 17 03/26/2025    ALT 20 03/26/2025     Lab Results   Component Value Date    TSH 1.290 04/02/2025   Free T4 1.27        Assessment/Plan    1.  Pericardial effusion possibly related to history of Hodgkin's.  Updated Dr. Jacobs about recent pericardiocentesis.  Suspects late effects of radiation.  Currently stable on repeat echo with Dr. Márquez.  On colchicine.  No current plans for further intervention.  Plans to follow up in 2 months.  Pt with ongoing SOA.  Mod/severe AS.  Appears he is stable from a cardiac standpoint.  I have asked pt to follow up with Dr. Wood.  There has been previous discussion of CT imaging.   May be related to lingering effect of recent respiratory infection.  He has completed antibiotics/steroids.  Will go ahead and check labs today on pt.      2.  History of Hodgkin's lymphoma with acute history of chemotherapy and radiation.  Late effects of radiation is probably the more concerning issue.  There is a risk of a secondary malignancies such as lung cancer.  I.    3.   Recurrent epididymitis.  He has been on prolonged antibiotics in the past.  Follows up with urology.    Follow up in 3 months with Dr. Jacobs in office    I spent 32 minutes caring for Angel.  This includes time spent by me in the following activities:  preparing for visit, reviewing tests, obtaining history, performing physical exam, education, ordering necessary medications/testing and documenting in the medical record.    ANYA Wilson  [facility]         No orders of the defined types were placed in this encounter.      5/12/2025

## 2025-05-14 ENCOUNTER — TELEPHONE (OUTPATIENT)
Dept: CARDIOLOGY | Facility: CLINIC | Age: 60
End: 2025-05-14
Payer: COMMERCIAL

## 2025-05-14 LAB
MYCOBACTERIUM SPEC CULT: NORMAL
NIGHT BLUE STAIN TISS: NORMAL

## 2025-05-14 NOTE — TELEPHONE ENCOUNTER
BP READINGS FROM 05/08-05/14 AFTER TAKING SPIRONOLACTONE:   87/69- MAY 14TH  96/69-13TH  93/61- 12TH  117/74-11TH    Spoke with patient on the phone, pt experiencing orthostatic symptoms with this, dizziness and lightheadedness. Pt has cut valsartan to 160mg previously per Dr. Márquez's suggestion. Pt held spironolactone last night, as well. Pt checked blood pressure while on the phone and it was 97/65 HR 99. Dr. Márquez's suggests to continue to hold spironolactone and monitor blood pressures. Patient also aware to drink water and stay hydrated. Patient verbalizes understanding and will call back with questions or BP changes.

## 2025-05-14 NOTE — TELEPHONE ENCOUNTER
Caller: Angel Campbell    Relationship: Self    Best call back number: [phone]     What is the best time to reach you: ANYTIME    Who are you requesting to speak with (clinical staff, provider,  specific staff member): NURSE      What was the call regarding: BP READINGS FROM 05/08-05/14 AFTER TAKING SPIRONOLACTONE:   87/69- MAY 14TH  96/69-13TH  93/61- 12TH  117/74-11TH    PT HAS EXPERIENCING DIZZINESS AND LIGHT HEADEDNESS. BP HAS BEEN LOW. PT HAD A TELEHEATH APPOINTMENT WITH HIS ONCOLOGIST AND HAD BLOOD WORK, PT IS REQUESTING FOR DR. MAYS TO LOOK AT THOSE. HE HAD A FEW TESTS THAT WERE ABNORMAL. PT IS WANTING TO KNOW WHAT HE NEEDS TO DO ABOUT THE SYMPTOMS HE IS HAVING?     Is it okay if the provider responds through MyChart: PLEASE CALL PT

## 2025-05-19 ENCOUNTER — TELEPHONE (OUTPATIENT)
Dept: CARDIOLOGY | Facility: CLINIC | Age: 60
End: 2025-05-19
Payer: COMMERCIAL

## 2025-05-19 DIAGNOSIS — I10 PRIMARY HYPERTENSION: Chronic | ICD-10-CM

## 2025-05-19 DIAGNOSIS — I31.39 PERICARDIAL EFFUSION: Chronic | ICD-10-CM

## 2025-05-19 RX ORDER — COLCHICINE 0.6 MG/1
0.6 TABLET ORAL 2 TIMES DAILY
Qty: 90 TABLET | Refills: 3 | Status: SHIPPED | OUTPATIENT
Start: 2025-05-19

## 2025-05-19 NOTE — TELEPHONE ENCOUNTER
Patient called requesting lab orders to be sent to [facility] and requesting a refill on Cochicine. Refill sent to pharmacy. [facility], also requested recent labs that patient had done. Lab orders faxed to [phone].    Patient states that his o2 levels have been dropping to 87%, but recovers quickly. States Dr. Márquez is aware of this. Follows pulmonology. Patient also states he continues to have low Bps. Per last conversation, patient had cut his valsartan in half d/t hypotension. We also d/c'd spironolactone. When we did, patient increased valsartan dose back to 320mg on his own. Patient told to continue to cut valsartan in half because he is still continuing to have low bps. Patient aware to continue to monitor Bps.     5/18 /77   5/18 /69     5/17 PM 87/69     5/16 /74  5/16 PM 89/63     5/15 /69  5/15 PM 93/56

## 2025-05-21 RX ORDER — VALSARTAN 320 MG/1
160 TABLET ORAL DAILY
Qty: 30 TABLET | Refills: 3 | Status: SHIPPED | OUTPATIENT
Start: 2025-05-21 | End: 2025-05-21

## 2025-05-21 RX ORDER — VALSARTAN 160 MG/1
160 TABLET ORAL DAILY
Qty: 90 TABLET | Refills: 3 | Status: SHIPPED | OUTPATIENT
Start: 2025-05-21

## 2025-05-21 NOTE — TELEPHONE ENCOUNTER
Med list updated in chart.     Patient states dizziness has resolved. Blood pressures are staying up more, but patient will continue to monitor.     5/20 /83  5/21 /75    Patient states he is still having shortness of breath, but has not gotten worse with the d/c of spironolactone. Pt states shortness of breath is with exertion or when he does anything quickly.

## 2025-05-22 ENCOUNTER — TELEPHONE (OUTPATIENT)
Dept: CARDIOLOGY | Facility: CLINIC | Age: 60
End: 2025-05-22
Payer: COMMERCIAL

## 2025-05-22 DIAGNOSIS — I10 PRIMARY HYPERTENSION: Primary | ICD-10-CM

## 2025-05-22 NOTE — TELEPHONE ENCOUNTER
----- Message from Darrell Márquez sent at 5/21/2025  6:09 PM EDT -----  I reviewed his blood work.  He is mildly anemic.  Sodium is also quite low.  I am not sure how to explain this.  Is he just dehydrated?.  This would explain why he feels dizzy.  Could be the spironolactone.  If this has been stopped, I would make sure that he hydrates well and then repeat another BMP next week.  His primary care should be made aware of these abnormalities as well

## 2025-05-22 NOTE — TELEPHONE ENCOUNTER
Spoke with patient regarding Dr. Márquez's recommendations. Patient verbalizes understanding to hydrate well and get labs next week. Lab orders placed and faxed to [facility]. Fax: [phone]

## 2025-05-30 LAB
AMBIG ABBREV BMP8 DEFAULT: NORMAL
BUN SERPL-MCNC: 10 MG/DL (ref 6–24)
BUN/CREAT SERPL: 11 (ref 9–20)
CALCIUM SERPL-MCNC: 9.1 MG/DL (ref 8.7–10.2)
CHLORIDE SERPL-SCNC: 95 MMOL/L (ref 96–106)
CO2 SERPL-SCNC: 21 MMOL/L (ref 20–29)
CREAT SERPL-MCNC: 0.87 MG/DL (ref 0.76–1.27)
EGFRCR SERPLBLD CKD-EPI 2021: 100 ML/MIN/1.73
GLUCOSE SERPL-MCNC: 102 MG/DL (ref 70–99)
POTASSIUM SERPL-SCNC: 4.6 MMOL/L (ref 3.5–5.2)
SODIUM SERPL-SCNC: 131 MMOL/L (ref 134–144)

## 2025-06-25 ENCOUNTER — OFFICE VISIT (OUTPATIENT)
Dept: PULMONOLOGY | Facility: CLINIC | Age: 60
End: 2025-06-25
Payer: COMMERCIAL

## 2025-06-25 VITALS
OXYGEN SATURATION: 97 % | WEIGHT: 190 LBS | HEART RATE: 81 BPM | BODY MASS INDEX: 28.14 KG/M2 | HEIGHT: 69 IN | TEMPERATURE: 98.2 F | DIASTOLIC BLOOD PRESSURE: 88 MMHG | SYSTOLIC BLOOD PRESSURE: 130 MMHG

## 2025-06-25 DIAGNOSIS — G47.33 OSA ON CPAP: ICD-10-CM

## 2025-06-25 DIAGNOSIS — I31.39 PERICARDIAL EFFUSION: ICD-10-CM

## 2025-06-25 DIAGNOSIS — R09.02 EXERCISE HYPOXEMIA: ICD-10-CM

## 2025-06-25 DIAGNOSIS — R06.02 SHORTNESS OF BREATH: Primary | ICD-10-CM

## 2025-06-25 NOTE — PROGRESS NOTES
Pulmonary Patient Office Visit      Patient Name: Angel Campbell    Referring Physician: No ref. provider found    Chief Complaint:    Chief Complaint   Patient presents with    Shortness of Breath    Follow-up       History of Present Illness: Angel Campbell is a 60 y.o. male who is here today to establish care with Pulmonary for shortness of breath, hypoxemia, sleep apnea.    Pleasant 59-year-old male with previous history of Hodgkin lymphoma diagnosed in 1989-treated with systemic chemotherapy and radiation, hypothyroidism, GERD, recurrent epididymitis, history of craniotomy for colloid cyst in the past.  Patient is referred here for evaluation of shortness of breath.  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  He has been noticing his pulse ox at home and it is going down to 77% after taking shower or even after doing minimal  exertion activity.  States that he cannot work out anymore because he feels very short of breath.  He denies any history of asthma growing up.  Lifelong non-smoker and no secondhand smoke exposure.  Denies any chest pain.  Denies any chronic cough, sputum production.  Denies any fever, chills or night sweats.  States that his appetite has been low and has lost about 40 pounds of weight in last year but he has been attempting to lose weight and that weight loss has not helped his shortness of breath either.  Denies any frequent pneumonias or infections or hospitalization.    During the workup patient was found to have large pericardial effusion for which patient was referred to CT surgery but they did not think patient would benefit from pericardial window.  He was referred here for further evaluation for any pulmonary etiology of his shortness of breath.    Patient does drink alcohol 2-3 drinks a night.  Denies any other illicit drug use.    Patient was referred to cardiology for second opinion  and underwent pericardiocentesis with more than 1 L of fluid removed with pericardial drain.  It was hemorrhagic exudative effusion.  Cytology was negative.  Cultures remain negative as well.  After the pericardiocentesis patient is feeling better in terms of his shortness of breath and chest discomfort but still gets dyspneic with activity and oxygen saturation dropped down to 87% and uses oxygen intermittently.  He states that the worst time is after his shower when his oxygen saturation drops and he has to use supplemental oxygen.  He is not using any oxygen at night however with the CPAP for now.  He does get dyspneic with activity and overall feels better after the pericardial drain but not back to baseline.      Subjective      Review of Systems:   Review of Systems   Constitutional: Negative.    HENT: Negative.     Respiratory:  Positive for shortness of breath.    Cardiovascular: Negative.    Gastrointestinal: Negative.  Positive for GERD.   Endocrine: Negative.    Musculoskeletal: Negative.    Skin: Negative.    Neurological:  Positive for dizziness and light-headedness.   Hematological: Negative.    Psychiatric/Behavioral:  Positive for depressed mood. The patient is nervous/anxious.    All other systems reviewed and are negative.      Past Medical History:   Past Medical History:   Diagnosis Date    Allergic rhinitis     Anxiety 30+ years    Asthma March 2024    SOA from pericardial effusion    GERD (gastroesophageal reflux disease)     Heart murmur 30+ years    Heart valve disease 2019    Hiatal hernia 1990    Was not mentioned on previous EGD    Hodgkin lymphoma     Hodgkin's disease     Hyperlipidemia 2022    Hypertension     Hypothyroidism     Sleep apnea, obstructive        Past Surgical History:   Past Surgical History:   Procedure Laterality Date    CARDIAC CATHETERIZATION N/A 4/2/2025    Procedure: Pericardiocentesis;  Surgeon: Belinda Garcias MD;  [facility];   Service: Cardiology;  Laterality: N/A;    SKIN CANCER EXCISION  07/2019    Back- Basal Cell    SKIN CANCER EXCISION  2022    Back of neck       Family History:   Family History   Problem Relation Age of Onset    Breast cancer Mother     Cancer Mother         Breast Cancer--2020    Colon cancer Father     Cancer Father         Colorectal cancer--2023       Social History:   Social History     Socioeconomic History    Marital status:    Tobacco Use    Smoking status: Never     Passive exposure: Never    Smokeless tobacco: Never   Vaping Use    Vaping status: Never Used   Substance and Sexual Activity    Alcohol use: Yes     Alcohol/week: 1.0 - 2.0 standard drink of alcohol     Types: 1 - 2 Drinks containing 0.5 oz of alcohol per week     Comment: 1-2 drinks occasionally    Drug use: No    Sexual activity: Yes     Partners: Female     Birth control/protection: Partner of same sex       Medications:     Current Outpatient Medications:     ALPRAZolam (XANAX) 1 MG tablet, Take 1 tablet by mouth Daily As Needed for Anxiety., Disp: , Rfl:     cetirizine (ZyrTEC) 10 MG tablet, Take 1 tablet by mouth As Needed., Disp: , Rfl:     colchicine 0.6 MG tablet, Take 1 tablet by mouth 2 (Two) Times a Day., Disp: 90 tablet, Rfl: 3    esomeprazole (NexIUM) 20 MG capsule, Take 1 capsule by mouth Daily., Disp: , Rfl:     levothyroxine (SYNTHROID, LEVOTHROID) 125 MCG tablet, TAKE 1 TABLET BY MOUTH ONCE DAILY, Disp: 30 tablet, Rfl: 11    Tirzepatide-Weight Management (Zepbound) 2.5 MG/0.5ML solution auto-injector, Inject 1 mL under the skin into the appropriate area as directed 1 (One) Time Per Week., Disp: , Rfl:     valsartan (DIOVAN) 160 MG tablet, Take 1 tablet by mouth Daily., Disp: 90 tablet, Rfl: 3    zolpidem (AMBIEN) 10 MG tablet, Take 1 tablet by mouth At Night As Needed for Sleep., Disp: , Rfl:     Allergies:   Allergies   Allergen Reactions    Codeine Sulfate Other (See Comments)     Childhood reaction, reported by  "mother. Patient does not remember reaction        Objective     Physical Exam:  Vital Signs:   Vitals:    06/25/25 1138   BP: 130/88   Pulse: 81   Temp: 98.2 °F (36.8 °C)   SpO2: 97%  Comment: resting, room air   Weight: 86.2 kg (190 lb)   Height: 174 cm (68.5\")     Body mass index is 28.47 kg/m².    Physical Exam  Vitals and nursing note reviewed.   Constitutional:       General: He is not in acute distress.     Appearance: He is well-developed. He is not diaphoretic.   Neck:      Thyroid: No thyromegaly.      Trachea: No tracheal deviation.   Cardiovascular:      Rate and Rhythm: Normal rate and regular rhythm.      Heart sounds: Murmur (2/6 JOSE aortic area) heard.      No gallop.   Pulmonary:      Effort: Pulmonary effort is normal. No respiratory distress.      Breath sounds: Normal breath sounds. No wheezing or rales.   Musculoskeletal:      Right lower leg: No edema.      Left lower leg: No edema.      Comments: Clubbing: none   Neurological:      Mental Status: He is alert and oriented to person, place, and time.   Psychiatric:         Behavior: Behavior normal.         Thought Content: Thought content normal.         Judgment: Judgment normal.         Results Review:   I reviewed the patient's new clinical results.    PFT done today reviewed personally and showed normal spirometry.  No significant bronchodilator response.  Normal total lung capacity.  Normal DLCO.  No air trapping noted.    Chest x-ray reviewed and showed enlarged cardiac silhouette without any acute pulmonary pathology appreciated.    CPAP download data reviewed.  More than 4-hour compliance is 63%.  Average usage is 5 hours and 5 minutes.  Patient is on auto CPAP 4 to 11 cm water pressure.  95th percentile pressure is 10.6.  AHI 3.2.  No significant leak noted..    Overnight oximetry on auto CPAP reviewed and showed 3 studies with 1 study showing oxygen saturations less than 88% only 2 minutes and 51 seconds.  Another study showed oxygen " saturation below 88% about 15 minutes.    Previous echocardiogram reviewed and showed moderate aortic sclerosis with calcified aortic valve.  Large pericardial effusion.  Repeat echocardiogram after pericardiocentesis shows improved pericardial effusion with small posterior pericardial effusion persisting.      Assessment / Plan      Assessment:   Problem List Items Addressed This Visit          Cardiac and Vasculature    Pericardial effusion    Relevant Medications    colchicine 0.6 MG tablet     Other Visit Diagnoses         Shortness of breath    -  Primary    Relevant Orders    CT Angiogram Chest      Exercise hypoxemia          CHANG on CPAP                Plan:   1.  Patient presenting with complains of worsening shortness of breath which is progressive.  I did review all his pulmonary testing including PFTs which are completely normal study.  Patient was referred to cardiology for second opinion and underwent pericardial drain and pericardiocentesis with improvement in symptoms but intermittent hypoxia still persisting especially with heavy exertional activity.  Patient is using oxygen intermittently.  Since his symptoms not fully resolved I would like to do CT chest with angiogram technique to rule out any other pulmonary pathology we could be missing.  Discussed that we will look for any pulmonary embolism or any significant extrinsic tracheal compression which could be playing a role in his symptoms.  Less likely culprit could be lymphoma recurrence but that will be evaluated as well.  Patient is continuing on colchicine and following up with cardiology with repeat echocardiograms.    2.  Patient has moderate to severe aortic stenosis as well.  Cardiology team is aware and will follow-up with repeat echocardiograms.  If unable to define his etiology for hypoxemia will consider bubble study to rule out intracardiac shunt.    3.  Advised to continue using CPAP.  Compliance is marginal but AHI controlled.  Mild  hypoxia noted intermittently during sleep.  Will be monitored for now.    4.  In the meantime patient was advised to start doing some exercise program to build up his stamina and see if that helps.  We will continue rest of the pulmonary workup at this point.    All this discussed in detail with the patient and his wife.  Questions answered.  I will be in touch with patient once he undergoes CT angiogram and I will get back the results to him.  Otherwise I will see him back in 3 months for ongoing follow-up.    Follow Up:   3 months.  Arrange CT angiogram as soon as we can.    Discussed plan of care in detail with patient today. Patient verbally understands and agrees. I spent 46 minutes on this date of service. This time includes time spent by me in the following activities:preparing for the visit, reviewing hospital records, reviewing tests, obtaining and/or reviewing a separately obtained history, performing a medically appropriate examination, counseling the patient/family, ordering tests, or procedures, and/or documenting information in the medical record. This time excludes other separate billable services such as interpretation of tests or procedures, if applicable.        Carlos Manuel Wood MD  Pulmonary Critical Care and Sleep Medicine

## 2025-07-09 ENCOUNTER — LAB (OUTPATIENT)
Facility: HOSPITAL | Age: 60
End: 2025-07-09
Payer: COMMERCIAL

## 2025-07-09 ENCOUNTER — OFFICE VISIT (OUTPATIENT)
Dept: CARDIOLOGY | Facility: CLINIC | Age: 60
End: 2025-07-09
Payer: COMMERCIAL

## 2025-07-09 VITALS
HEART RATE: 73 BPM | OXYGEN SATURATION: 94 % | WEIGHT: 188.3 LBS | BODY MASS INDEX: 27.89 KG/M2 | DIASTOLIC BLOOD PRESSURE: 98 MMHG | HEIGHT: 69 IN | SYSTOLIC BLOOD PRESSURE: 194 MMHG

## 2025-07-09 DIAGNOSIS — I31.39 PERICARDIAL EFFUSION: Chronic | ICD-10-CM

## 2025-07-09 DIAGNOSIS — I10 PRIMARY HYPERTENSION: Chronic | ICD-10-CM

## 2025-07-09 DIAGNOSIS — I10 PRIMARY HYPERTENSION: ICD-10-CM

## 2025-07-09 DIAGNOSIS — I30.0 ACUTE IDIOPATHIC PERICARDITIS: ICD-10-CM

## 2025-07-09 DIAGNOSIS — I35.0 NONRHEUMATIC AORTIC VALVE STENOSIS: Primary | ICD-10-CM

## 2025-07-09 DIAGNOSIS — R06.09 DOE (DYSPNEA ON EXERTION): ICD-10-CM

## 2025-07-09 PROCEDURE — 80048 BASIC METABOLIC PNL TOTAL CA: CPT

## 2025-07-09 PROCEDURE — 36415 COLL VENOUS BLD VENIPUNCTURE: CPT

## 2025-07-09 RX ORDER — COLCHICINE 0.6 MG/1
0.6 TABLET ORAL 2 TIMES DAILY
Qty: 90 TABLET | Refills: 3 | Status: SHIPPED | OUTPATIENT
Start: 2025-07-09

## 2025-07-09 NOTE — PROGRESS NOTES
[facility] Cardiology Office Follow Up Note    Angel Campbell  [id number]  2025    Primary Care Provider: Talon Leos DO    Chief Complaint   Patient presents with    Follow-up     2 month    Pericarditis       Subjective     History of Present Illness:   Angel Campbell is a 60 y.o. male with idiopathic pericarditis status post pericardiocentesis who presents to the Cardiology Clinic for follow up of his pericarditis.  He is accompanied by his wife today.  Says his symptoms are significantly improved.  Breathing is better.  Still has to use oxygen at night but wears a CPAP as well.  Denies any exertional chest pain.  Feels like his stamina has decreased since all this happened and is planning to start exercising to improve his physical condition.  His blood pressure is normal per his home BP log, averaging 120s over 80s.  Says that his blood pressure has been this high in his primary care doctor's office and it never went down until they let him go home.  Since his last visit, he was seen by pulmonology and has a CT PE scan scheduled tomorrow.    Past Cardiac History and Testin. Pericardial effusion  -- h/o pericarditis in , idiopathic?  --TTE 3/24 @[employer]: EF 55-60%, mild AS, moderate to large free-flowing pericardial effusion, circumferential.  No evidence of hemodynamic compromise.  -- TTE : EF 80%, LVH, moderate AS, large pericardial effusion measuring 2.4 cm  -- Not a pericardial window candidate per CTS   -- Echo 3/ 2025: EF 64%.  Mild LVH.  Mild to severe AS.  Moderate to large circumferential pericardial effusion, max diameter 2.5 cm along anterolateral wall  -- s/p pericardiocentesis with Dr. Garcias on 4/3/2025. 500 mL out.  Cytology negative    -- Echo 2025: EF 56%.  Moderate to severe AS, mild MR.  Less than 1 cm circumferential pericardial effusion with larger pocket along the left ventricle measuring 1.3 cm    2.  Aortic stenosis  -- Mild in 2024, read as  "moderate by Dr. Meyer in January 202 with a peak velocity of 3.6 m/s  -- April 2025: Moderate to severe with ELVA 0.8 cm², V-max 3.5 m/s, mean PG 30    3. H/o Hodgkins lymphoma in 1990, s/p chemo+radiation to the neck area    4. HTN  5.  Recurrent idiopathic pericarditis    Review of Systems:  Review of Systems   Constitutional:  Positive for fatigue. Negative for unexpected weight gain and unexpected weight loss.   Respiratory:  Positive for shortness of breath. Negative for cough and chest tightness.    Cardiovascular: Negative.    Gastrointestinal: Negative.    Musculoskeletal: Negative.    Neurological: Negative.        I have reviewed and/or updated the patient's past medical, past surgical, family, social history, problem list and allergies as appropriate.       Current Outpatient Medications:     ALPRAZolam (XANAX) 1 MG tablet, Take 1 tablet by mouth Daily As Needed for Anxiety., Disp: , Rfl:     cetirizine (ZyrTEC) 10 MG tablet, Take 1 tablet by mouth As Needed., Disp: , Rfl:     colchicine 0.6 MG tablet, Take 1 tablet by mouth 2 (Two) Times a Day., Disp: 90 tablet, Rfl: 3    esomeprazole (NexIUM) 20 MG capsule, Take 1 capsule by mouth Daily., Disp: , Rfl:     levothyroxine (SYNTHROID, LEVOTHROID) 125 MCG tablet, TAKE 1 TABLET BY MOUTH ONCE DAILY, Disp: 30 tablet, Rfl: 11    Tirzepatide-Weight Management (Zepbound) 2.5 MG/0.5ML solution auto-injector, Inject 1 mL under the skin into the appropriate area as directed 1 (One) Time Per Week., Disp: , Rfl:     valsartan (DIOVAN) 160 MG tablet, Take 1 tablet by mouth Daily., Disp: 90 tablet, Rfl: 3    zolpidem (AMBIEN) 10 MG tablet, Take 1 tablet by mouth At Night As Needed for Sleep., Disp: , Rfl:        Objective     Vitals:  Vitals:    07/09/25 1400   BP: (!) 194/98   BP Location: Left arm   Patient Position: Sitting   Cuff Size: Adult   Pulse: 73   SpO2: 94%   Weight: 85.4 kg (188 lb 4.8 oz)   Height: 174 cm (68.5\")       Physical Exam:  Vitals reviewed. "   Constitutional:       Appearance: Healthy appearance. Not in distress.   Pulmonary:      Effort: Pulmonary effort is normal.      Breath sounds: Normal breath sounds.   Cardiovascular:      Normal rate. Regular rhythm. Normal S1. Normal S2.       Murmurs: There is a grade 5/6 harsh midsystolic murmur at the URSB, radiating to the neck.      No gallop.  No click. No rub.   Pulses:     Intact distal pulses.   Edema:     Peripheral edema absent.   Skin:     General: Skin is warm and dry.         Results Review:   I reviewed the patient's new clinical results.  I reviewed the patient's new imaging results and agree with the interpretation.  I reviewed the patient's other test results and agree with the interpretation    Procedures    Most Recent Labs:  none          Assessment & Plan  Nonrheumatic aortic valve stenosis  Moderate to severe on echo. Severe ELVA but Dopplers more moderate.    Murmur stable.  Plan for echo in October, same day as appointment    Orders:    Adult Transthoracic Echo Complete W/ Cont if Necessary Per Protocol; Future     SALDIVAR (dyspnea on exertion)  Exertional dyspnea has improved significantly since pericardiocentesis.  Currently undergoing pulmonology workup and CT PE scan is pending tomorrow.  Still gets occasional desats and uses oxygen at home  Will plan to review the scan and if heavy coronary calcifications are noted, will consider stress test versus coronary CTA       Pericardial effusion  Acute idiopathic pericarditis  S/p pericardiocentesis.  Pericardial window likely unnecessary  Symptoms markedly improved.  Complete 6 months of colchicine. NSAIDs only as needed  No indication for steroids or Arcalyst at this time     Orders:    colchicine 0.6 MG tablet; Take 1 tablet by mouth 2 (Two) Times a Day.     Primary hypertension  Blood pressure still very high today, same even on recheck.  Patient completely asymptomatic.  Reports same occurring in primary care doctor's office.  We had to  stop his spironolactone because he was getting hypotensive and symptomatic at home.  Per his BP log on his phone, average readings are 120s over 80.  Likely has significant whitecoat hypertension  Provided patient with ER parameters on when to present especially with symptoms  Continue valsartan 160  Report blood pressures via log within 1 week  Checking BMP, namely sodium after stopping spironolactone  Continue nightly compliance with CPAP                   Plan   Preventative Cardiology:   Tobacco Cessation: N/A  Obstructive Sleep Apnea Screening: Completed           Follow Up:   Return in about 3 months (around 10/9/2025).    Thank you for allowing me to participate in the care of your patient. Please do not hesitate to contact me with additional questions or concerns.     I spent 43 minutes evaluating, treating, counseling, coordinating patient care, reviewing old/outside records, independently reviewing imaging/telemetry, and documenting. This excludes time spent on separately billable services and procedures.       Electronically signed by Darrell Márquez MD, 07/09/25, 6:23 PM EDT.

## 2025-07-09 NOTE — ASSESSMENT & PLAN NOTE
Blood pressure still very high today, same even on recheck.  Patient completely asymptomatic.  Reports same occurring in primary care doctor's office.  We had to stop his spironolactone because he was getting hypotensive and symptomatic at home.  Per his BP log on his phone, average readings are 120s over 80.  Likely has significant whitecoat hypertension  Provided patient with ER parameters on when to present especially with symptoms  Continue valsartan 160  Report blood pressures via log within 1 week  Checking BMP, namely sodium after stopping spironolactone  Continue nightly compliance with CPAP

## 2025-07-09 NOTE — ASSESSMENT & PLAN NOTE
Moderate to severe on echo. Severe ELVA but Dopplers more moderate.    Murmur stable.  Plan for echo in October, same day as appointment    Orders:    Adult Transthoracic Echo Complete W/ Cont if Necessary Per Protocol; Future

## 2025-07-09 NOTE — ASSESSMENT & PLAN NOTE
S/p pericardiocentesis.  Pericardial window likely unnecessary  Symptoms markedly improved.  Complete 6 months of colchicine. NSAIDs only as needed  No indication for steroids or Arcalyst at this time     Orders:    colchicine 0.6 MG tablet; Take 1 tablet by mouth 2 (Two) Times a Day.

## 2025-07-10 ENCOUNTER — HOSPITAL ENCOUNTER (OUTPATIENT)
Facility: HOSPITAL | Age: 60
Discharge: HOME | End: 2025-07-10
Admitting: INTERNAL MEDICINE
Payer: COMMERCIAL

## 2025-07-10 DIAGNOSIS — R06.02 SHORTNESS OF BREATH: ICD-10-CM

## 2025-07-10 LAB
ANION GAP SERPL CALCULATED.3IONS-SCNC: 13 MMOL/L (ref 5–15)
BUN SERPL-MCNC: 9 MG/DL (ref 6–20)
BUN/CREAT SERPL: 9 (ref 7–25)
CALCIUM SPEC-SCNC: 9.6 MG/DL (ref 8.6–10.5)
CHLORIDE SERPL-SCNC: 95 MMOL/L (ref 98–107)
CO2 SERPL-SCNC: 21 MMOL/L (ref 22–29)
CREAT SERPL-MCNC: 1 MG/DL (ref 0.76–1.27)
EGFRCR SERPLBLD CKD-EPI 2021: 87.2 ML/MIN/1.73
GLUCOSE SERPL-MCNC: 89 MG/DL (ref 65–99)
POTASSIUM SERPL-SCNC: 4.5 MMOL/L (ref 3.5–5.2)
SODIUM SERPL-SCNC: 129 MMOL/L (ref 136–145)

## 2025-07-10 PROCEDURE — 71275 CT ANGIOGRAPHY CHEST: CPT

## 2025-07-10 PROCEDURE — 25510000001 IOPAMIDOL PER 1 ML: Performed by: INTERNAL MEDICINE

## 2025-07-10 RX ORDER — IOPAMIDOL 755 MG/ML
100 INJECTION, SOLUTION INTRAVASCULAR
Status: COMPLETED | OUTPATIENT
Start: 2025-07-10 | End: 2025-07-10

## 2025-07-10 RX ADMIN — IOPAMIDOL 85 ML: 755 INJECTION, SOLUTION INTRAVENOUS at 08:50

## 2025-07-11 ENCOUNTER — DOCUMENTATION (OUTPATIENT)
Dept: SLEEP MEDICINE | Facility: CLINIC | Age: 60
End: 2025-07-11
Payer: COMMERCIAL

## 2025-07-11 ENCOUNTER — TELEPHONE (OUTPATIENT)
Dept: PULMONOLOGY | Facility: CLINIC | Age: 60
End: 2025-07-11
Payer: COMMERCIAL

## 2025-07-11 ENCOUNTER — TELEPHONE (OUTPATIENT)
Dept: CARDIOLOGY | Facility: CLINIC | Age: 60
End: 2025-07-11
Payer: COMMERCIAL

## 2025-07-11 NOTE — PROGRESS NOTES
Called patient to discuss CT angiogram results.  Unable to connect with him so left a message.  No pulmonary embolism noted.  No significant pulmonary pathology noted on the CT scan.  Still some pericardial effusion noted.  Will discuss with cardiology team to see if anything else needs to be done.  Will continue to follow in pulmonary clinic.

## 2025-07-18 ENCOUNTER — RESULTS FOLLOW-UP (OUTPATIENT)
Dept: CARDIOLOGY | Facility: CLINIC | Age: 60
End: 2025-07-18
Payer: COMMERCIAL

## 2025-07-21 ENCOUNTER — TELEPHONE (OUTPATIENT)
Dept: CARDIOLOGY | Facility: CLINIC | Age: 60
End: 2025-07-21
Payer: COMMERCIAL

## 2025-07-21 ENCOUNTER — RESULTS FOLLOW-UP (OUTPATIENT)
Dept: PULMONOLOGY | Facility: CLINIC | Age: 60
End: 2025-07-21
Payer: COMMERCIAL

## 2025-07-21 NOTE — TELEPHONE ENCOUNTER
----- Message from Carlos Manuel Wood sent at 7/21/2025 12:56 PM EDT -----  Thank you Dr Márquez for your help.     Carlos Manuel    ----- Message -----  From: Darrell Márquez MD  Sent: 7/18/2025   6:55 PM EDT  To: Bessy Rendon RN; Carlos Manuel Wood MD    Thanks for sharing this Dr. Wood    I think the pericardial effusion is quite small and is not amenable to drainage.  During my last visit with him, he told me he was feeling much better.  We may need to try adding steroids to his   colchicine.  I would like for him to complete 6 months of that and we will be repeating his echo within the next 3 months to reassess his aortic stenosis.  Not sure where all of this hyponatremia is   coming from either.  We will be sending him to nephrology.    Bessy, can you call the patient and let him know that I reviewed his CT scan.  The effusion is quite small.  I would like for him to get a cardiac CTA so that we can better look at his coronaries.    There was no heavy coronary calcification noted on the CT  PE scan.    Please ask about his symptoms.  If his symptoms are about the same or better, I would leave things unchanged.  If he is still having significant shortness of breath, we can try adding steroids but   this will be another 2 to 3 months ordeal and we will need to be tapering them.  Arcalyst may be an option to if he fails both colchicine and steroid      ----- Message -----  From: Cralos Manuel Wood MD  Sent: 7/11/2025   2:45 PM EDT  To: Darrell Márquez MD    Hi Dr Márquez,     Could you please look this CT. this is for mutual patient who has shortness of breath issues going on.  He felt better after the pericardiocentesis.  I was looking if there is any other pulmonary   pathology but CT looks pretty good and I really do not have anything else to offer him.  They are still commenting on pericardial effusion I do not know if that is something substantial we need to   worry about.    Thank you,  Carlos Manuel  ----- Message  -----  From: Interface, Rad Results [place] In  Sent: 7/10/2025   9:13 AM EDT  To: Carlos Manuel Wood MD

## 2025-07-21 NOTE — TELEPHONE ENCOUNTER
Patient verbalizes understanding of results. Patient is agreeable to cardiac CTA.     Patient states he feels like symptoms have gotten better, but does experience some shortness of breath/low o2 levels after showering, or some types exertional exercise, but mostly soon after the shower. O2 levels high 80s, and then come back up in less than a minute. Patient states he has been going on walks and when he does, he wears a pulse ox and o2 levels stay above 90%. Patient is willing to try adding steroids, if this has the potential to make things better.

## 2025-07-27 ENCOUNTER — PATIENT MESSAGE (OUTPATIENT)
Dept: CARDIOLOGY | Facility: CLINIC | Age: 60
End: 2025-07-27
Payer: COMMERCIAL

## 2025-07-28 DIAGNOSIS — R06.09 DOE (DYSPNEA ON EXERTION): Primary | ICD-10-CM

## 2025-07-28 DIAGNOSIS — I35.0 NONRHEUMATIC AORTIC VALVE STENOSIS: ICD-10-CM

## 2025-07-28 RX ORDER — SODIUM CHLORIDE 0.9 % (FLUSH) 0.9 %
10 SYRINGE (ML) INJECTION EVERY 12 HOURS SCHEDULED
Status: CANCELLED | OUTPATIENT
Start: 2025-07-28

## 2025-07-28 RX ORDER — METOPROLOL TARTRATE 25 MG/1
50 TABLET, FILM COATED ORAL ONCE
Status: CANCELLED | OUTPATIENT
Start: 2025-07-28

## 2025-07-28 RX ORDER — METOPROLOL TARTRATE 50 MG
TABLET ORAL
Qty: 2 TABLET | Refills: 0 | Status: SHIPPED | OUTPATIENT
Start: 2025-07-28

## 2025-07-28 RX ORDER — NITROGLYCERIN 0.4 MG/1
0.4 TABLET SUBLINGUAL
Status: CANCELLED | OUTPATIENT
Start: 2025-07-28 | End: 2025-07-28

## 2025-07-28 RX ORDER — METOPROLOL TARTRATE 25 MG/1
150 TABLET, FILM COATED ORAL ONCE
Status: CANCELLED | OUTPATIENT
Start: 2025-07-28

## 2025-07-28 RX ORDER — IVABRADINE 5 MG/1
15 TABLET, FILM COATED ORAL ONCE
Status: CANCELLED | OUTPATIENT
Start: 2025-07-28 | End: 2025-07-28

## 2025-07-28 RX ORDER — METOPROLOL TARTRATE 50 MG
50 TABLET ORAL
Status: CANCELLED | OUTPATIENT
Start: 2025-07-28

## 2025-07-28 RX ORDER — METOPROLOL TARTRATE 1 MG/ML
5 INJECTION, SOLUTION INTRAVENOUS
Status: CANCELLED | OUTPATIENT
Start: 2025-07-28

## 2025-07-28 RX ORDER — SODIUM CHLORIDE 9 MG/ML
40 INJECTION, SOLUTION INTRAVENOUS AS NEEDED
Status: CANCELLED | OUTPATIENT
Start: 2025-07-28

## 2025-07-28 RX ORDER — NITROGLYCERIN 0.4 MG/1
0.8 TABLET SUBLINGUAL
Status: CANCELLED | OUTPATIENT
Start: 2025-07-28

## 2025-07-28 RX ORDER — SODIUM CHLORIDE 0.9 % (FLUSH) 0.9 %
10 SYRINGE (ML) INJECTION AS NEEDED
Status: CANCELLED | OUTPATIENT
Start: 2025-07-28

## 2025-07-28 RX ORDER — METOPROLOL TARTRATE 25 MG/1
200 TABLET, FILM COATED ORAL ONCE
Status: CANCELLED | OUTPATIENT
Start: 2025-07-28 | End: 2025-07-28

## 2025-07-28 RX ORDER — METOPROLOL TARTRATE 25 MG/1
100 TABLET, FILM COATED ORAL ONCE
Status: CANCELLED | OUTPATIENT
Start: 2025-07-28

## 2025-07-31 ENCOUNTER — TELEPHONE (OUTPATIENT)
Facility: HOSPITAL | Age: 60
End: 2025-07-31
Payer: COMMERCIAL

## 2025-08-01 ENCOUNTER — HOSPITAL ENCOUNTER (OUTPATIENT)
Facility: HOSPITAL | Age: 60
Discharge: HOME | End: 2025-08-01
Payer: COMMERCIAL

## 2025-08-01 VITALS
TEMPERATURE: 98 F | DIASTOLIC BLOOD PRESSURE: 79 MMHG | BODY MASS INDEX: 27.72 KG/M2 | RESPIRATION RATE: 18 BRPM | OXYGEN SATURATION: 100 % | HEIGHT: 68 IN | WEIGHT: 182.87 LBS | HEART RATE: 69 BPM | SYSTOLIC BLOOD PRESSURE: 102 MMHG

## 2025-08-01 DIAGNOSIS — R06.09 DOE (DYSPNEA ON EXERTION): ICD-10-CM

## 2025-08-01 DIAGNOSIS — I35.0 NONRHEUMATIC AORTIC VALVE STENOSIS: ICD-10-CM

## 2025-08-01 LAB — CREAT BLDA-MCNC: 1 MG/DL (ref 0.6–1.3)

## 2025-08-01 PROCEDURE — 25510000001 IOPAMIDOL PER 1 ML: Performed by: INTERNAL MEDICINE

## 2025-08-01 PROCEDURE — 82565 ASSAY OF CREATININE: CPT | Performed by: INTERNAL MEDICINE

## 2025-08-01 PROCEDURE — 75574 CT ANGIO HRT W/3D IMAGE: CPT

## 2025-08-01 RX ORDER — NITROGLYCERIN 0.4 MG/1
0.4 TABLET SUBLINGUAL
Status: COMPLETED | OUTPATIENT
Start: 2025-08-01 | End: 2025-08-01

## 2025-08-01 RX ORDER — SODIUM CHLORIDE 0.9 % (FLUSH) 0.9 %
10 SYRINGE (ML) INJECTION AS NEEDED
Status: DISCONTINUED | OUTPATIENT
Start: 2025-08-01 | End: 2025-08-02 | Stop reason: HOSPADM

## 2025-08-01 RX ORDER — METOPROLOL TARTRATE 25 MG/1
50 TABLET, FILM COATED ORAL
Status: DISCONTINUED | OUTPATIENT
Start: 2025-08-01 | End: 2025-08-02 | Stop reason: HOSPADM

## 2025-08-01 RX ORDER — IOPAMIDOL 755 MG/ML
100 INJECTION, SOLUTION INTRAVASCULAR
Status: COMPLETED | OUTPATIENT
Start: 2025-08-01 | End: 2025-08-01

## 2025-08-01 RX ORDER — METOPROLOL TARTRATE 1 MG/ML
5 INJECTION, SOLUTION INTRAVENOUS
Status: DISCONTINUED | OUTPATIENT
Start: 2025-08-01 | End: 2025-08-02 | Stop reason: HOSPADM

## 2025-08-01 RX ORDER — METOPROLOL TARTRATE 25 MG/1
50 TABLET, FILM COATED ORAL ONCE
Status: COMPLETED | OUTPATIENT
Start: 2025-08-01 | End: 2025-08-01

## 2025-08-01 RX ORDER — METOPROLOL TARTRATE 100 MG/1
200 TABLET ORAL ONCE
Status: COMPLETED | OUTPATIENT
Start: 2025-08-01 | End: 2025-08-01

## 2025-08-01 RX ORDER — IVABRADINE 5 MG/1
15 TABLET, FILM COATED ORAL ONCE
Status: COMPLETED | OUTPATIENT
Start: 2025-08-01 | End: 2025-08-01

## 2025-08-01 RX ORDER — METOPROLOL TARTRATE 100 MG/1
100 TABLET ORAL ONCE
Status: COMPLETED | OUTPATIENT
Start: 2025-08-01 | End: 2025-08-01

## 2025-08-01 RX ORDER — NITROGLYCERIN 0.4 MG/1
0.8 TABLET SUBLINGUAL
Status: COMPLETED | OUTPATIENT
Start: 2025-08-01 | End: 2025-08-01

## 2025-08-01 RX ORDER — SODIUM CHLORIDE 9 MG/ML
40 INJECTION, SOLUTION INTRAVENOUS AS NEEDED
Status: DISCONTINUED | OUTPATIENT
Start: 2025-08-01 | End: 2025-08-02 | Stop reason: HOSPADM

## 2025-08-01 RX ORDER — SODIUM CHLORIDE 0.9 % (FLUSH) 0.9 %
10 SYRINGE (ML) INJECTION EVERY 12 HOURS SCHEDULED
Status: DISCONTINUED | OUTPATIENT
Start: 2025-08-01 | End: 2025-08-02 | Stop reason: HOSPADM

## 2025-08-01 RX ADMIN — METOPROLOL TARTRATE 150 MG: 100 TABLET, FILM COATED ORAL at 16:01

## 2025-08-01 RX ADMIN — IVABRADINE 15 MG: 5 TABLET, FILM COATED ORAL at 14:59

## 2025-08-01 RX ADMIN — NITROGLYCERIN 0.8 MG: 0.4 TABLET SUBLINGUAL at 16:47

## 2025-08-01 RX ADMIN — IOPAMIDOL 65 ML: 755 INJECTION, SOLUTION INTRAVENOUS at 17:02

## 2025-08-05 ENCOUNTER — HOSPITAL ENCOUNTER (OUTPATIENT)
Dept: CT IMAGING | Facility: HOSPITAL | Age: 60
Discharge: HOME | End: 2025-08-05
Admitting: INTERNAL MEDICINE
Payer: COMMERCIAL

## 2025-08-05 ENCOUNTER — RESULTS FOLLOW-UP (OUTPATIENT)
Dept: CARDIOLOGY | Facility: CLINIC | Age: 60
End: 2025-08-05
Payer: COMMERCIAL

## 2025-08-05 DIAGNOSIS — R93.1 ABNORMAL FINDINGS DIAGNOSTIC IMAGING OF HEART AND CORONARY CIRCULATION: ICD-10-CM

## 2025-08-05 DIAGNOSIS — I48.91 ATRIAL FIBRILLATION, UNSPECIFIED TYPE: Primary | ICD-10-CM

## 2025-08-05 DIAGNOSIS — R93.1 ABNORMAL FINDINGS DIAGNOSTIC IMAGING OF HEART AND CORONARY CIRCULATION: Primary | ICD-10-CM

## 2025-08-05 PROCEDURE — 75580 N-INVAS EST C FFR SW ALY CTA: CPT

## 2025-08-18 ENCOUNTER — OFFICE VISIT (OUTPATIENT)
Age: 60
End: 2025-08-18
Payer: COMMERCIAL

## 2025-08-18 VITALS
BODY MASS INDEX: 27.89 KG/M2 | SYSTOLIC BLOOD PRESSURE: 143 MMHG | RESPIRATION RATE: 16 BRPM | TEMPERATURE: 98.2 F | DIASTOLIC BLOOD PRESSURE: 96 MMHG | OXYGEN SATURATION: 93 % | WEIGHT: 184 LBS | HEIGHT: 68 IN | HEART RATE: 98 BPM

## 2025-08-18 DIAGNOSIS — I35.0 NONRHEUMATIC AORTIC VALVE STENOSIS: ICD-10-CM

## 2025-08-18 DIAGNOSIS — Z98.890 STATUS POST PERICARDIOCENTESIS: ICD-10-CM

## 2025-08-18 DIAGNOSIS — Z85.71 HISTORY OF HODGKIN'S DISEASE: Primary | ICD-10-CM

## 2025-08-18 RX ORDER — TIRZEPATIDE 5 MG/.5ML
INJECTION, SOLUTION SUBCUTANEOUS
COMMUNITY
Start: 2025-08-12

## 2025-08-27 ENCOUNTER — TELEPHONE (OUTPATIENT)
Dept: CARDIOLOGY | Facility: CLINIC | Age: 60
End: 2025-08-27
Payer: COMMERCIAL

## (undated) DEVICE — GW XCHG AMPLTZ XSTIF PTFE CRV .035IN 3X180CM

## (undated) DEVICE — KT PERICARDIOCENT .035 STR 8.3F

## (undated) DEVICE — PK CATH CARD 10

## (undated) DEVICE — RADIFOCUS GLIDEWIRE ADVANTAGE GUIDEWIRE: Brand: GLIDEWIRE ADVANTAGE